# Patient Record
Sex: FEMALE | Race: WHITE | NOT HISPANIC OR LATINO | Employment: FULL TIME
[De-identification: names, ages, dates, MRNs, and addresses within clinical notes are randomized per-mention and may not be internally consistent; named-entity substitution may affect disease eponyms.]

---

## 2018-10-24 ENCOUNTER — RECORDS - HEALTHEAST (OUTPATIENT)
Dept: ADMINISTRATIVE | Facility: OTHER | Age: 43
End: 2018-10-24

## 2018-12-17 ENCOUNTER — OFFICE VISIT - HEALTHEAST (OUTPATIENT)
Dept: RHEUMATOLOGY | Facility: CLINIC | Age: 43
End: 2018-12-17

## 2018-12-17 DIAGNOSIS — I10 ESSENTIAL HYPERTENSION: ICD-10-CM

## 2018-12-17 DIAGNOSIS — M25.50 POLYARTHRALGIA: ICD-10-CM

## 2018-12-17 DIAGNOSIS — M15.0 PRIMARY OSTEOARTHRITIS INVOLVING MULTIPLE JOINTS: ICD-10-CM

## 2018-12-17 LAB — RHEUMATOID FACT SERPL-ACNC: <15 IU/ML (ref 0–30)

## 2018-12-17 RX ORDER — AMLODIPINE BESYLATE 10 MG/1
10 TABLET ORAL DAILY
Status: ON HOLD | COMMUNITY
Start: 2018-08-03 | End: 2022-02-09

## 2018-12-17 ASSESSMENT — MIFFLIN-ST. JEOR: SCORE: 1630.99

## 2018-12-18 LAB
CCP AB SER IA-ACNC: <0.5 U/ML
HCV AB SERPL QL IA: NEGATIVE

## 2018-12-28 ENCOUNTER — COMMUNICATION - HEALTHEAST (OUTPATIENT)
Dept: ADMINISTRATIVE | Facility: CLINIC | Age: 43
End: 2018-12-28

## 2019-01-03 ENCOUNTER — OFFICE VISIT - HEALTHEAST (OUTPATIENT)
Dept: RHEUMATOLOGY | Facility: CLINIC | Age: 44
End: 2019-01-03

## 2019-01-03 DIAGNOSIS — I10 ESSENTIAL HYPERTENSION: ICD-10-CM

## 2019-01-03 DIAGNOSIS — M25.50 POLYARTHRALGIA: ICD-10-CM

## 2019-01-03 DIAGNOSIS — M15.0 PRIMARY OSTEOARTHRITIS INVOLVING MULTIPLE JOINTS: ICD-10-CM

## 2019-02-05 ENCOUNTER — COMMUNICATION - HEALTHEAST (OUTPATIENT)
Dept: RHEUMATOLOGY | Facility: CLINIC | Age: 44
End: 2019-02-05

## 2019-02-05 DIAGNOSIS — M15.0 PRIMARY OSTEOARTHRITIS INVOLVING MULTIPLE JOINTS: ICD-10-CM

## 2019-05-16 ENCOUNTER — COMMUNICATION - HEALTHEAST (OUTPATIENT)
Dept: RHEUMATOLOGY | Facility: CLINIC | Age: 44
End: 2019-05-16

## 2019-05-16 DIAGNOSIS — M15.0 PRIMARY OSTEOARTHRITIS INVOLVING MULTIPLE JOINTS: ICD-10-CM

## 2021-05-25 ENCOUNTER — RECORDS - HEALTHEAST (OUTPATIENT)
Dept: ADMINISTRATIVE | Facility: CLINIC | Age: 46
End: 2021-05-25

## 2021-05-28 NOTE — TELEPHONE ENCOUNTER
meloxicam rx was cancelled at pharmacy as it was already sent this morning, they will put this back and remove from their system.

## 2021-05-28 NOTE — TELEPHONE ENCOUNTER
----- Message from Adria Heredia sent at 5/17/2019 10:31 AM CDT -----  Patient declined appt because she is no longer getting meds from Dr. Solomon.      ----- Message -----  From: Roseanne Teague RN  Sent: 5/17/2019   9:28 AM  To: Rheumatology Scheduling Registration Pool    Please call pt to schedule follow up with Dr Solomon.

## 2021-06-02 ENCOUNTER — RECORDS - HEALTHEAST (OUTPATIENT)
Dept: ADMINISTRATIVE | Facility: CLINIC | Age: 46
End: 2021-06-02

## 2021-06-02 VITALS — HEIGHT: 65 IN | WEIGHT: 222 LBS | BODY MASS INDEX: 36.99 KG/M2

## 2021-06-02 VITALS — BODY MASS INDEX: 37.44 KG/M2 | WEIGHT: 222 LBS

## 2021-06-16 PROBLEM — I10 ESSENTIAL HYPERTENSION: Status: ACTIVE | Noted: 2018-12-17

## 2021-06-16 PROBLEM — M25.50 POLYARTHRALGIA: Status: ACTIVE | Noted: 2018-12-17

## 2021-06-16 PROBLEM — M15.0 PRIMARY OSTEOARTHRITIS INVOLVING MULTIPLE JOINTS: Status: ACTIVE | Noted: 2018-12-17

## 2021-06-22 NOTE — PROGRESS NOTES
ASSESSMENT AND PLAN:  Maria R Mcdaniel 43 y.o. female is seen here on 01/03/19 for follow-up of polyarthralgias.  She does not have evidence of inflammatory joint disease.  There is no evidence of connective tissue disease.  She does not have rheumatoid arthritis, lupus, features of gout, psoriatic arthritis.  There is no serologic evidence of autoimmunity.  She has osteoarthritis.  She is on duloxetine.  Naprosyn did not help her.  We discussed options.  She is going to try meloxicam.  If this does not provide her relief she may consider pain clinic.  We will meet here in the next 3 months.  Diagnoses and all orders for this visit:    Primary osteoarthritis involving multiple joints  -     meloxicam (MOBIC) 15 MG tablet  Dispense: 30 tablet; Refill: 0    Essential hypertension    Polyarthralgia      HISTORY OF PRESENTING ILLNESS:  Maria R Mcdaniel, 43 y.o., female is here for follow-up of recent evaluation of polyarthralgias.  She noted that currently the worst of her symptoms are in the mid feet area bilaterally however she has in addition pain in her feet first MTPs, neck lower back.  This is gone on for the past 6-7 years.  She carries a diagnosis of fibromyalgia.  She is duloxetine which is helped.  She takes ibuprofen over-the-counter.  She has not observed swelling in any of the joints that are painful.  Her symptoms are worse after she has been up and about on her feet.  She rates the pain as severe.  He is stiff in the morning for up to 3-4 hours stenosis neck across the back.  Naprosyn did not help her.   She has noted weight gain of 25 pounds recently, she has felt fatigued and generally weak and tired.  She reports no personal family history of ulcerative colitis Crohn's disease.  She reports that has history of psoriasis, she is going to clarify that if it is eczema or psoriasis.  She was.  She is a smoker alcohol none.  She noted family history of psoriasis dad, who passed away of a coronary  artery disease.  She has worked as a .  Further historical information and ADL limitations as noted in the multidimensional health assessment questionnaire attached in the EMR. ALLERGIES:Patient has no known allergies.    PAST MEDICAL/ACTIVE PROBLEMS/MEDICATION/ FAMILY HISTORY/SOCIAL DATA:  The patient has a family history of  No past medical history on file.  Social History     Tobacco Use   Smoking Status Current Every Day Smoker     Types: Cigarettes   Smokeless Tobacco Never Used     Patient Active Problem List   Diagnosis     Hyperprolactinemia     Obesity     Secondary Amenorrhea     Polyarthralgia     Primary osteoarthritis involving multiple joints     Essential hypertension     Current Outpatient Medications   Medication Sig Dispense Refill     amLODIPine (NORVASC) 10 MG tablet TAKE ONE TABLET BY MOUTH ONE TIME DAILY       DULoxetine (CYMBALTA) 60 MG capsule Take 60 mg by mouth daily.       HYDROcodone-acetaminophen 5-325 mg per tablet Take 1 tablet by mouth every 4 (four) hours as needed for pain.       levothyroxine (SYNTHROID, LEVOTHROID) 125 MCG tablet Take 125 mcg by mouth daily.       losartan (COZAAR) 25 MG tablet Take 100 mg by mouth daily.       meloxicam (MOBIC) 15 MG tablet Take 1 tablet (15 mg total) by mouth daily. 30 tablet 0     No current facility-administered medications for this visit.      DETAILED EXAMINATION  01/03/19  :  Vitals:    01/03/19 0843   BP: 112/80   Patient Site: Right Arm   Patient Position: Sitting   Cuff Size: Adult Large   Pulse: 80   Weight: 222 lb (100.7 kg)     Alert oriented. Head including the face is examined for malar rash, heliotropes, scarring, lupus pernio. Eyes examined for redness such as in episcleritis/scleritis, periorbital lesions.   Neck/ Face examined for parotid gland swelling, range of motion of neck.  Left upper and lower and right upper and lower extremities examined for tenderness, swelling, warmth of the appendicular joints, range of  motion, edema, rash.  Some of the important findings included: She does not have synovitis in any of the palpable joints of upper and lower extremities.  She has tenderness in her feet which is more in the mid feet joint area.  And no synovitis is changes in the MTPs she is tender in the first MTPs bilaterally.  She has minimal impingement of the shoulders.  Nails are painted.  Several tattoos.        She does not have dactylitis of digits or toes.  She has her nails painted.  Several tattoos.  She has tenderness of the first MTPs bilaterally.  She does not have synovitis of any of the palpable joints of upper and lower extremities.  She does not have swelling or JLT effusion of the knees on either side.    LAB / IMAGING DATA:  No results found for: ALT, ALBUMIN, CREATININE    No results found for: WBC, HGB, PLT    Lab Results   Component Value Date    RF <15.0 12/17/2018

## 2021-06-22 NOTE — PROGRESS NOTES
ASSESSMENT AND PLAN:  Maria R Mcdaniel 43 y.o. female is seen here on 12/17/18 for evaluation of polyarthralgias, predominantly affecting the joints in the feet, MTPs, neck and back.  She has evidence of osteoarthritis recent MRI of the foot done elsewhere that report is reviewed.  She is on duloxetine.  She is going to see how her naproxen 500 mg twice daily may help.  1 of the options is to have corticosteroid injection of the first MTPs.  She noted father's history of psoriasis.  The likelihood that she has inflammatory joint disease connective tissue disease is going to be kept under consideration, however appears to be remote.  She is going to monitor her blood pressure carefully as she takes naproxen, she has a home monitoring machine.  We will check labs as noted.  Return for follow-up in the next 2 months.  Diagnoses and all orders for this visit:    Polyarthralgia  -     Rheumatoid Factor Quant  -     CCP Antibodies  -     Hepatitis C Antibody (Anti-HCV)  -     naproxen (NAPROSYN) 500 MG tablet; Take 1 tablet (500 mg total) by mouth 2 (two) times a day with meals.  Dispense: 60 tablet; Refill: 1    Primary osteoarthritis involving multiple joints  -     naproxen (NAPROSYN) 500 MG tablet; Take 1 tablet (500 mg total) by mouth 2 (two) times a day with meals.  Dispense: 60 tablet; Refill: 1    Essential hypertension      HISTORY OF PRESENTING ILLNESS:  Maria R Mcdaniel, 43 y.o., female is here for polyarthralgias.  This is predominant especially her feet first MTPs, knees, neck lower back.  This is gone on for the past 6-7 years.  She carries a diagnosis of fibromyalgia.  She is duloxetine which is helped.  She takes ibuprofen over-the-counter.  She has not observed swelling in any of the joints that are painful.  Her symptoms are worse after she has been up and about on her feet.  She rates the pain as severe.  He is stiff in the morning for up to 3-4 hours stenosis neck across the back.  She has noted  "weight gain of 25 pounds recently, she has felt fatigued and generally weak and tired.  She reports no personal family history of ulcerative colitis Crohn's disease.  She reports that has history of psoriasis, she is going to clarify that if it is eczema or psoriasis.  She was.  She is a smoker alcohol none.  She noted family history of psoriasis dad, who passed away of a coronary artery disease.  She has worked as a .  Further historical information and ADL limitations as noted in the multidimensional health assessment questionnaire attached in the EMR. Rest of the 13 system ROS is negative.     ALLERGIES:Patient has no known allergies.    PAST MEDICAL/ACTIVE PROBLEMS/MEDICATION/ FAMILY HISTORY/SOCIAL DATA:  The patient has a family history of  No past medical history on file.  Social History     Tobacco Use   Smoking Status Current Every Day Smoker     Types: Cigarettes   Smokeless Tobacco Never Used     Patient Active Problem List   Diagnosis     Hyperprolactinemia     Obesity     Secondary Amenorrhea     Current Outpatient Medications   Medication Sig Dispense Refill     amLODIPine (NORVASC) 10 MG tablet TAKE ONE TABLET BY MOUTH ONE TIME DAILY       DULoxetine (CYMBALTA) 60 MG capsule Take 60 mg by mouth daily.       HYDROcodone-acetaminophen 5-325 mg per tablet Take 1 tablet by mouth every 4 (four) hours as needed for pain.       levothyroxine (SYNTHROID, LEVOTHROID) 125 MCG tablet Take 125 mcg by mouth daily.       losartan (COZAAR) 25 MG tablet Take 100 mg by mouth daily.       No current facility-administered medications for this visit.        COMPREHENSIVE EXAMINATION:  Vitals:    12/17/18 1022   BP: 110/80   Patient Site: Right Arm   Patient Position: Sitting   Cuff Size: Adult Regular   Pulse: 76   Weight: 222 lb (100.7 kg)   Height: 5' 4.57\" (1.64 m)     A well appearing alert oriented female. Vital data as noted above. Her eyes without inflammation/scleromalacia. ENTwithout oral mucositis, " thrush, nasal deformity, external ear redness, deformity. Her neck is without lymphadenopathy and supple. Lungs normal sounds, no pleural rub. Heart auscultation normal rate, rhythm; no pericardial rub and murmurs. Abdomen soft, non tender, no organomegaly. Skin examined for heliotrope, malar area eruption, lupus pernio, periungual erythema, sclerodactyly, papules, erythema nodosum, purpura, nail pitting, onycholysis, and obvious psoriasis lesion. Neurological examination shows normal alertness, speech, facial symmetry, tone and power in upper and lower extremities, Tinel's and Phalen's at wrist and gait. The joint examination is performed for swelling, tenderness, warmth, erythema, and range of motion in the following joints: DIPs, PIPs, MCPs, wrists, first CMC's, elbows, shoulders, hips, knees, ankles, feet; spine for range of motion and paraspinal muscles for tenderness. The salient normal / abnormal findings are appended.  She does not have dactylitis of digits or toes.  She has her nails painted.  Several tattoos.  She has tenderness of the first MTPs bilaterally.  She does not have synovitis of any of the palpable joints of upper and lower extremities.  She does not have swelling or JLT effusion of the knees on either side.    LAB / IMAGING DATA:  No results found for: ALT, ALBUMIN, CREATININE    No results found for: WBC, HGB, PLT    No results found for: ANIVAL, RF, SEDRATE

## 2021-07-21 ENCOUNTER — RECORDS - HEALTHEAST (OUTPATIENT)
Dept: ADMINISTRATIVE | Facility: CLINIC | Age: 46
End: 2021-07-21

## 2021-08-17 ENCOUNTER — TRANSFERRED RECORDS (OUTPATIENT)
Dept: HEALTH INFORMATION MANAGEMENT | Facility: CLINIC | Age: 46
End: 2021-08-17
Payer: COMMERCIAL

## 2021-11-01 ENCOUNTER — TRANSFERRED RECORDS (OUTPATIENT)
Dept: HEALTH INFORMATION MANAGEMENT | Facility: CLINIC | Age: 46
End: 2021-11-01
Payer: COMMERCIAL

## 2021-11-11 ENCOUNTER — HOSPITAL ENCOUNTER (OUTPATIENT)
Facility: AMBULATORY SURGERY CENTER | Age: 46
End: 2021-11-11
Attending: OBSTETRICS & GYNECOLOGY

## 2021-11-12 DIAGNOSIS — Z11.59 ENCOUNTER FOR SCREENING FOR OTHER VIRAL DISEASES: ICD-10-CM

## 2022-01-20 ENCOUNTER — OFFICE VISIT (OUTPATIENT)
Dept: RHEUMATOLOGY | Facility: CLINIC | Age: 47
End: 2022-01-20
Payer: COMMERCIAL

## 2022-01-20 VITALS
HEART RATE: 80 BPM | BODY MASS INDEX: 33.7 KG/M2 | WEIGHT: 202.3 LBS | HEIGHT: 65 IN | SYSTOLIC BLOOD PRESSURE: 100 MMHG | DIASTOLIC BLOOD PRESSURE: 76 MMHG

## 2022-01-20 DIAGNOSIS — M25.50 POLYARTHRALGIA: ICD-10-CM

## 2022-01-20 DIAGNOSIS — M15.0 PRIMARY OSTEOARTHRITIS INVOLVING MULTIPLE JOINTS: ICD-10-CM

## 2022-01-20 DIAGNOSIS — M70.61 GREATER TROCHANTERIC BURSITIS OF RIGHT HIP: Primary | ICD-10-CM

## 2022-01-20 PROCEDURE — 20610 DRAIN/INJ JOINT/BURSA W/O US: CPT | Mod: RT | Performed by: INTERNAL MEDICINE

## 2022-01-20 PROCEDURE — 99203 OFFICE O/P NEW LOW 30 MIN: CPT | Mod: 25 | Performed by: INTERNAL MEDICINE

## 2022-01-20 RX ORDER — TRIAMCINOLONE ACETONIDE 40 MG/ML
40 INJECTION, SUSPENSION INTRA-ARTICULAR; INTRAMUSCULAR ONCE
Status: COMPLETED | OUTPATIENT
Start: 2022-01-20 | End: 2022-01-20

## 2022-01-20 RX ADMIN — TRIAMCINOLONE ACETONIDE 40 MG: 40 INJECTION, SUSPENSION INTRA-ARTICULAR; INTRAMUSCULAR at 15:02

## 2022-01-20 ASSESSMENT — MIFFLIN-ST. JEOR: SCORE: 1540.57

## 2022-01-20 NOTE — PROGRESS NOTES
Rheumatology follow-up visit note     Maria R is a 46 year old female presents today for follow-up.    Maria R was seen today for recheck.    Diagnoses and all orders for this visit:    Greater trochanteric bursitis of right hip  -     triamcinolone (KENALOG-40) injection 40 mg    Polyarthralgia    Primary osteoarthritis involving multiple joints            After pros and cons were discussed including risk of infection, bleeding, skin changes including thinning, pigmentary alteration and scarring to name a few, right hip injected as noted in the orders section. This was done with nontouch technique.  The patient tolerated the procedure well and had a brisk Marcaine effect.  The postinjection care was discussed.     Follow-up as needed    HPI    Maria R Mcdaniel is a 46 year old female is here for follow-up after a long interval.  She reports painful hip area by which she meant trochanteric region, this is on the right side, this keeps her up at night, there is no radiation down the leg, she has not had a fall or other trauma recently.  She has carry diagnosis of fibromyalgia she is on duloxetine.  She reports that at nighttime with her fibromyalgia related to poor quality of sleep the right trochanteric pain is making it worse.  Besides the pain in the trochanteric area she has noted discomfort in other joint regions including the knees, the neck and lower back.  She is well known to have spondylosis.    Following is the excerpt from a previous note from 2019:   This is gone on for the past 6-7 years.  She carries a diagnosis of fibromyalgia.  She is duloxetine which is helped.  She takes ibuprofen over-the-counter.  She has not observed swelling in any of the joints that are painful.  Her symptoms are worse after she has been up and about on her feet.  She rates the pain as severe.  He is stiff in the morning for up to 3-4 hours stenosis neck across the back.  Naprosyn did not help her.   She has noted  "weight gain of 25 pounds recently, she has felt fatigued and generally weak and tired.  She reports no personal family history of ulcerative colitis Crohn's disease.  She reports that has history of psoriasis, she is going to clarify that if it is eczema or psoriasis.  She was.  She is a smoker alcohol none.  She noted family history of psoriasis dad, who passed away of a coronary artery disease.  She has worked as a .       DETAILED EXAMINATION  01/20/22  :    Vitals:    01/20/22 1157   BP: 100/76   BP Location: Right arm   Patient Position: Sitting   Cuff Size: Adult Large   Pulse: 80   Weight: 91.8 kg (202 lb 4.8 oz)   Height: 1.638 m (5' 4.5\")     Alert oriented. Head including the face is examined for malar rash, heliotropes, scarring, lupus pernio. Eyes examined for redness such as in episcleritis/scleritis, periorbital lesions.   Neck/ Face examined for parotid gland swelling, range of motion of neck.  Left upper and lower and right upper and lower extremities examined for tenderness, swelling, warmth of the appendicular joints, range of motion, edema, rash.  Some of the important findings included: she does not have evidence of synovitis in the palpable joints of the upper extremities.  No significant deformities of the digits.  No Heberden nodes.  Range of motion of the shoulders   show full abduction.  No JLT effusion or warmth of the knees.  she does not have dactylitis of the digits.  She has tenderness in the trochanteric area on the right side minimal on the left side.     Patient Active Problem List    Diagnosis Date Noted     Polyarthralgia 12/17/2018     Priority: Medium     Primary osteoarthritis involving multiple joints 12/17/2018     Priority: Medium     Essential hypertension 12/17/2018     Priority: Medium     Hyperprolactinemia      Priority: Medium     Created by Conversion         Obesity      Priority: Medium     Created by Conversion         Secondary Amenorrhea      Priority: " Medium     Created by Conversion         Past Surgical History:   Procedure Laterality Date     ZZC GASTRIC BYPASS,OBESE<100CM RODY-EN-Y      Description: Gastric Surgery For Morbid Obesity Bypass With Rdoy-en-Y;  Recorded: 09/16/2008;      No past medical history on file.  No Known Allergies  Current Outpatient Medications   Medication Sig Dispense Refill     amLODIPine (NORVASC) 10 MG tablet [AMLODIPINE (NORVASC) 10 MG TABLET] TAKE ONE TABLET BY MOUTH ONE TIME DAILY       DULoxetine (CYMBALTA) 60 MG capsule [DULOXETINE (CYMBALTA) 60 MG CAPSULE] Take 60 mg by mouth daily.       levothyroxine (SYNTHROID, LEVOTHROID) 125 MCG tablet [LEVOTHYROXINE (SYNTHROID, LEVOTHROID) 125 MCG TABLET] Take 125 mcg by mouth daily.       losartan (COZAAR) 25 MG tablet [LOSARTAN (COZAAR) 25 MG TABLET] Take 100 mg by mouth daily.       MULTIPLE VITAMIN PO Take 1 tablet by mouth daily       HYDROcodone-acetaminophen 5-325 mg per tablet [HYDROCODONE-ACETAMINOPHEN 5-325 MG PER TABLET] Take 1 tablet by mouth every 4 (four) hours as needed for pain. (Patient not taking: Reported on 1/20/2022)       family history is not on file.  Social Connections: Not on file          No results found for: WBC, RBC, HGB, HCT, MCV, MCH, PLT, LYMPH, AST, ALT, ALBUMIN, ALKPHOS, CR, GFRESTIMATED, GFRESTBLACK, CRPOC, CCR, UCRR, SED, CRP, NTBNPI

## 2022-02-01 ENCOUNTER — TELEPHONE (OUTPATIENT)
Dept: RHEUMATOLOGY | Facility: CLINIC | Age: 47
End: 2022-02-01
Payer: COMMERCIAL

## 2022-02-01 NOTE — TELEPHONE ENCOUNTER
I called the pt and informed we did receive the MRI, Dr Solomon reviewed and recommend she come see him for an injection or see ortho to discuss surgery. Pt scheduled with Dr Solomon.

## 2022-02-01 NOTE — TELEPHONE ENCOUNTER
Patient is wondering if clinical received results of MRI from [place] and what the next steps are. Please call to discuss further.

## 2022-02-02 ENCOUNTER — OFFICE VISIT (OUTPATIENT)
Dept: RHEUMATOLOGY | Facility: CLINIC | Age: 47
End: 2022-02-02
Payer: COMMERCIAL

## 2022-02-02 VITALS
HEART RATE: 80 BPM | SYSTOLIC BLOOD PRESSURE: 126 MMHG | DIASTOLIC BLOOD PRESSURE: 76 MMHG | WEIGHT: 201.7 LBS | HEIGHT: 65 IN | BODY MASS INDEX: 33.61 KG/M2

## 2022-02-02 DIAGNOSIS — M25.50 POLYARTHRALGIA: ICD-10-CM

## 2022-02-02 DIAGNOSIS — M15.0 PRIMARY OSTEOARTHRITIS INVOLVING MULTIPLE JOINTS: ICD-10-CM

## 2022-02-02 DIAGNOSIS — M75.81 TENDINITIS OF RIGHT ROTATOR CUFF: Primary | ICD-10-CM

## 2022-02-02 PROCEDURE — 20610 DRAIN/INJ JOINT/BURSA W/O US: CPT | Mod: RT | Performed by: INTERNAL MEDICINE

## 2022-02-02 PROCEDURE — 99214 OFFICE O/P EST MOD 30 MIN: CPT | Mod: 25 | Performed by: INTERNAL MEDICINE

## 2022-02-02 RX ORDER — TRIAMCINOLONE ACETONIDE 40 MG/ML
40 INJECTION, SUSPENSION INTRA-ARTICULAR; INTRAMUSCULAR ONCE
Status: COMPLETED | OUTPATIENT
Start: 2022-02-02 | End: 2022-02-02

## 2022-02-02 RX ADMIN — TRIAMCINOLONE ACETONIDE 40 MG: 40 INJECTION, SUSPENSION INTRA-ARTICULAR; INTRAMUSCULAR at 14:34

## 2022-02-02 ASSESSMENT — MIFFLIN-ST. JEOR: SCORE: 1537.85

## 2022-02-02 NOTE — PROGRESS NOTES
Rheumatology follow-up visit note     Maria R is a 46 year old female presents today for follow-up.    Maria R was seen today for recheck.    Diagnoses and all orders for this visit:    Tendinitis of right rotator cuff  -     triamcinolone (KENALOG-40) injection 40 mg  -     ASPIRATION/INJECTION MAJOR JOINT    Polyarthralgia    Primary osteoarthritis involving multiple joints        This patient has right shoulder rotator cuff tendinopathy, tear supraspinatus, good response to corticosteroid injections in the remote past, worsening pain, she wonders if she could have another corticosteroid injection pros and cons were reviewed 40 mg Kenalog injected subacromially on the right side.  Follow-up as needed.  HPI    Maria R Mcdaniel is a 46 year old female is here for worsening pain this is in her right shoulder, this is gone on for several weeks, she was seen in orthopedics, she had corticosteroid injection subacromially it would appear, an MRI done at that point of the right shoulder report is reviewed and attached in the chart.  She has supraspinatus tear and other tendons showing evidence of tendinopathy without features of inflammatory joint disease such as any exuberant synovitis or significant joint effusion.  Recently she had trochanteric area injection which provided her good relief.  She has noted discomfort in other joint regions including the knees, the neck and lower back.  She is well known to have spondylosis.    Following is the excerpt from a previous note from 2019:   This is gone on for the past 6-7 years.  She carries a diagnosis of fibromyalgia.  She is duloxetine which is helped.  She takes ibuprofen over-the-counter.  She has not observed swelling in any of the joints that are painful.  Her symptoms are worse after she has been up and about on her feet.  She rates the pain as severe.  He is stiff in the morning for up to 3-4 hours stenosis neck across the back.  Naprosyn did not help  "her.   She has noted weight gain of 25 pounds recently, she has felt fatigued and generally weak and tired.  She reports no personal family history of ulcerative colitis Crohn's disease.  She reports that has history of psoriasis, she is going to clarify that if it is eczema or psoriasis.  She was.  She is a smoker alcohol none.  She noted family history of psoriasis dad, who passed away of a coronary artery disease.  She has worked as a .           DETAILED EXAMINATION  02/02/22  :    Vitals:    02/02/22 1353   BP: 126/76   BP Location: Right arm   Patient Position: Sitting   Cuff Size: Adult Large   Pulse: 80   Weight: 91.5 kg (201 lb 11.2 oz)   Height: 1.638 m (5' 4.5\")     Alert oriented. Head including the face is examined for malar rash, heliotropes, scarring, lupus pernio. Eyes examined for redness such as in episcleritis/scleritis, periorbital lesions.   Neck/ Face examined for parotid gland swelling, range of motion of neck.  Left upper and lower and right upper and lower extremities examined for tenderness, swelling, warmth of the appendicular joints, range of motion, edema, rash.  Some of the important findings included: she does not have evidence of synovitis in the palpable joints of the upper extremities.  No significant deformities of the digits.  No Heberden nodes.  Range of motion of the shoulders  show full abduction on the left side on the right she has painful arc and able to abduct actively to about 160 degrees..  No JLT effusion or warmth of the knees.  she does not have dactylitis of the digits.     Patient Active Problem List    Diagnosis Date Noted     Polyarthralgia 12/17/2018     Priority: Medium     Primary osteoarthritis involving multiple joints 12/17/2018     Priority: Medium     Essential hypertension 12/17/2018     Priority: Medium     Hyperprolactinemia      Priority: Medium     Created by Conversion         Obesity      Priority: Medium     Created by Conversion         " Secondary Amenorrhea      Priority: Medium     Created by Conversion         Past Surgical History:   Procedure Laterality Date     ZZC GASTRIC BYPASS,OBESE<100CM RODY-EN-Y      Description: Gastric Surgery For Morbid Obesity Bypass With Rody-en-Y;  Recorded: 09/16/2008;      No past medical history on file.  No Known Allergies  Current Outpatient Medications   Medication Sig Dispense Refill     amLODIPine (NORVASC) 10 MG tablet [AMLODIPINE (NORVASC) 10 MG TABLET] TAKE ONE TABLET BY MOUTH ONE TIME DAILY       DULoxetine (CYMBALTA) 60 MG capsule [DULOXETINE (CYMBALTA) 60 MG CAPSULE] Take 60 mg by mouth daily.       levothyroxine (SYNTHROID, LEVOTHROID) 125 MCG tablet [LEVOTHYROXINE (SYNTHROID, LEVOTHROID) 125 MCG TABLET] Take 125 mcg by mouth daily.       losartan (COZAAR) 25 MG tablet [LOSARTAN (COZAAR) 25 MG TABLET] Take 100 mg by mouth daily.       MULTIPLE VITAMIN PO Take 1 tablet by mouth daily       HYDROcodone-acetaminophen 5-325 mg per tablet [HYDROCODONE-ACETAMINOPHEN 5-325 MG PER TABLET] Take 1 tablet by mouth every 4 (four) hours as needed for pain. (Patient not taking: Reported on 1/20/2022)       family history is not on file.  Social Connections: Not on file          No results found for: WBC, RBC, HGB, HCT, MCV, MCH, PLT, LYMPH, AST, ALT, ALBUMIN, ALKPHOS, CR, GFRESTIMATED, GFRESTBLACK, CRPOC, CCR, UCRR, SED, CRP, NTBNPI

## 2022-02-07 PROBLEM — K63.1 PERFORATION OF INTESTINE (H): Status: ACTIVE | Noted: 2022-02-07

## 2022-02-07 PROBLEM — K85.00 IDIOPATHIC ACUTE PANCREATITIS WITHOUT INFECTION OR NECROSIS: Status: ACTIVE | Noted: 2022-02-07

## 2022-02-07 LAB
ABO/RH(D): NORMAL
ANTIBODY SCREEN: NEGATIVE
LACTATE SERPL-SCNC: 0.5 MMOL/L (ref 0.7–2)
SPECIMEN EXPIRATION DATE: NORMAL

## 2022-02-07 PROCEDURE — 96375 TX/PRO/DX INJ NEW DRUG ADDON: CPT

## 2022-02-07 PROCEDURE — 96361 HYDRATE IV INFUSION ADD-ON: CPT

## 2022-02-07 PROCEDURE — 258N000003 HC RX IP 258 OP 636: Performed by: EMERGENCY MEDICINE

## 2022-02-07 PROCEDURE — 96365 THER/PROPH/DIAG IV INF INIT: CPT

## 2022-02-07 PROCEDURE — 36415 COLL VENOUS BLD VENIPUNCTURE: CPT | Performed by: EMERGENCY MEDICINE

## 2022-02-07 PROCEDURE — 83605 ASSAY OF LACTIC ACID: CPT | Performed by: EMERGENCY MEDICINE

## 2022-02-07 PROCEDURE — C9803 HOPD COVID-19 SPEC COLLECT: HCPCS

## 2022-02-07 PROCEDURE — 99285 EMERGENCY DEPT VISIT HI MDM: CPT | Mod: 25

## 2022-02-07 RX ORDER — PIPERACILLIN SODIUM, TAZOBACTAM SODIUM 3; .375 G/15ML; G/15ML
3.38 INJECTION, POWDER, LYOPHILIZED, FOR SOLUTION INTRAVENOUS ONCE
Status: COMPLETED | OUTPATIENT
Start: 2022-02-07 | End: 2022-02-08

## 2022-02-07 RX ORDER — DULOXETIN HYDROCHLORIDE 30 MG/1
30 CAPSULE, DELAYED RELEASE ORAL DAILY
COMMUNITY
End: 2023-02-18

## 2022-02-07 RX ORDER — SODIUM CHLORIDE 9 MG/ML
INJECTION, SOLUTION INTRAVENOUS ONCE
Status: COMPLETED | OUTPATIENT
Start: 2022-02-07 | End: 2022-02-08

## 2022-02-07 RX ORDER — IBUPROFEN 800 MG/1
800 TABLET, FILM COATED ORAL EVERY 8 HOURS PRN
Status: ON HOLD | COMMUNITY
End: 2022-02-09

## 2022-02-07 RX ADMIN — SODIUM CHLORIDE: 9 INJECTION, SOLUTION INTRAVENOUS at 22:33

## 2022-02-08 ENCOUNTER — APPOINTMENT (OUTPATIENT)
Dept: RADIOLOGY | Facility: HOSPITAL | Age: 47
DRG: 393 | End: 2022-02-08
Attending: EMERGENCY MEDICINE
Payer: COMMERCIAL

## 2022-02-08 ENCOUNTER — HOSPITAL ENCOUNTER (INPATIENT)
Facility: HOSPITAL | Age: 47
LOS: 1 days | Discharge: HOME | DRG: 393 | End: 2022-02-09
Attending: EMERGENCY MEDICINE | Admitting: STUDENT IN AN ORGANIZED HEALTH CARE EDUCATION/TRAINING PROGRAM
Payer: COMMERCIAL

## 2022-02-08 DIAGNOSIS — K28.9 MARGINAL ULCER: Primary | ICD-10-CM

## 2022-02-08 DIAGNOSIS — K63.1 PERFORATION OF INTESTINE (H): ICD-10-CM

## 2022-02-08 DIAGNOSIS — K85.00 IDIOPATHIC ACUTE PANCREATITIS WITHOUT INFECTION OR NECROSIS: ICD-10-CM

## 2022-02-08 LAB
ALBUMIN SERPL-MCNC: 3.3 G/DL (ref 3.5–5)
ALP SERPL-CCNC: 46 U/L (ref 45–120)
ALT SERPL W P-5'-P-CCNC: 17 U/L (ref 0–45)
ANION GAP SERPL CALCULATED.3IONS-SCNC: 8 MMOL/L (ref 5–18)
AST SERPL W P-5'-P-CCNC: 20 U/L (ref 0–40)
ATRIAL RATE - MUSE: 69 BPM
BASOPHILS # BLD AUTO: 0 10E3/UL (ref 0–0.2)
BASOPHILS NFR BLD AUTO: 0 %
BILIRUB SERPL-MCNC: 0.9 MG/DL (ref 0–1)
BUN SERPL-MCNC: 12 MG/DL (ref 8–22)
CALCIUM SERPL-MCNC: 8.3 MG/DL (ref 8.5–10.5)
CHLORIDE BLD-SCNC: 112 MMOL/L (ref 98–107)
CO2 SERPL-SCNC: 20 MMOL/L (ref 22–31)
CREAT SERPL-MCNC: 0.63 MG/DL (ref 0.6–1.1)
DIASTOLIC BLOOD PRESSURE - MUSE: NORMAL MMHG
EOSINOPHIL # BLD AUTO: 0.3 10E3/UL (ref 0–0.7)
EOSINOPHIL NFR BLD AUTO: 3 %
ERYTHROCYTE [DISTWIDTH] IN BLOOD BY AUTOMATED COUNT: 14.6 % (ref 10–15)
GFR SERPL CREATININE-BSD FRML MDRD: >90 ML/MIN/1.73M2
GLUCOSE BLD-MCNC: 93 MG/DL (ref 70–125)
HCG SERPL QL: NEGATIVE
HCT VFR BLD AUTO: 34.7 % (ref 35–47)
HGB BLD-MCNC: 11 G/DL (ref 11.7–15.7)
IMM GRANULOCYTES # BLD: 0 10E3/UL
IMM GRANULOCYTES NFR BLD: 0 %
INTERPRETATION ECG - MUSE: NORMAL
LIPASE SERPL-CCNC: 77 U/L (ref 0–52)
LYMPHOCYTES # BLD AUTO: 2.3 10E3/UL (ref 0.8–5.3)
LYMPHOCYTES NFR BLD AUTO: 23 %
MCH RBC QN AUTO: 26.1 PG (ref 26.5–33)
MCHC RBC AUTO-ENTMCNC: 31.7 G/DL (ref 31.5–36.5)
MCV RBC AUTO: 82 FL (ref 78–100)
MONOCYTES # BLD AUTO: 0.7 10E3/UL (ref 0–1.3)
MONOCYTES NFR BLD AUTO: 7 %
NEUTROPHILS # BLD AUTO: 6.5 10E3/UL (ref 1.6–8.3)
NEUTROPHILS NFR BLD AUTO: 67 %
NRBC # BLD AUTO: 0 10E3/UL
NRBC BLD AUTO-RTO: 0 /100
P AXIS - MUSE: 50 DEGREES
PLATELET # BLD AUTO: 256 10E3/UL (ref 150–450)
POTASSIUM BLD-SCNC: 3.9 MMOL/L (ref 3.5–5)
PR INTERVAL - MUSE: 160 MS
PROT SERPL-MCNC: 6 G/DL (ref 6–8)
QRS DURATION - MUSE: 128 MS
QT - MUSE: 446 MS
QTC - MUSE: 477 MS
R AXIS - MUSE: -21 DEGREES
RBC # BLD AUTO: 4.22 10E6/UL (ref 3.8–5.2)
SARS-COV-2 RNA RESP QL NAA+PROBE: NEGATIVE
SODIUM SERPL-SCNC: 140 MMOL/L (ref 136–145)
SYSTOLIC BLOOD PRESSURE - MUSE: NORMAL MMHG
T AXIS - MUSE: 92 DEGREES
VENTRICULAR RATE- MUSE: 69 BPM
WBC # BLD AUTO: 9.8 10E3/UL (ref 4–11)

## 2022-02-08 PROCEDURE — 250N000011 HC RX IP 250 OP 636: Performed by: EMERGENCY MEDICINE

## 2022-02-08 PROCEDURE — 99222 1ST HOSP IP/OBS MODERATE 55: CPT | Performed by: SURGERY

## 2022-02-08 PROCEDURE — 86850 RBC ANTIBODY SCREEN: CPT | Performed by: EMERGENCY MEDICINE

## 2022-02-08 PROCEDURE — 85025 COMPLETE CBC W/AUTO DIFF WBC: CPT | Performed by: STUDENT IN AN ORGANIZED HEALTH CARE EDUCATION/TRAINING PROGRAM

## 2022-02-08 PROCEDURE — 83690 ASSAY OF LIPASE: CPT | Performed by: STUDENT IN AN ORGANIZED HEALTH CARE EDUCATION/TRAINING PROGRAM

## 2022-02-08 PROCEDURE — 250N000011 HC RX IP 250 OP 636: Performed by: STUDENT IN AN ORGANIZED HEALTH CARE EDUCATION/TRAINING PROGRAM

## 2022-02-08 PROCEDURE — 71046 X-RAY EXAM CHEST 2 VIEWS: CPT

## 2022-02-08 PROCEDURE — 87635 SARS-COV-2 COVID-19 AMP PRB: CPT | Performed by: EMERGENCY MEDICINE

## 2022-02-08 PROCEDURE — 93005 ELECTROCARDIOGRAM TRACING: CPT | Performed by: EMERGENCY MEDICINE

## 2022-02-08 PROCEDURE — 99222 1ST HOSP IP/OBS MODERATE 55: CPT | Performed by: STUDENT IN AN ORGANIZED HEALTH CARE EDUCATION/TRAINING PROGRAM

## 2022-02-08 PROCEDURE — 258N000001 HC RX 258: Performed by: STUDENT IN AN ORGANIZED HEALTH CARE EDUCATION/TRAINING PROGRAM

## 2022-02-08 PROCEDURE — C9113 INJ PANTOPRAZOLE SODIUM, VIA: HCPCS | Performed by: EMERGENCY MEDICINE

## 2022-02-08 PROCEDURE — 82040 ASSAY OF SERUM ALBUMIN: CPT | Performed by: STUDENT IN AN ORGANIZED HEALTH CARE EDUCATION/TRAINING PROGRAM

## 2022-02-08 PROCEDURE — 80053 COMPREHEN METABOLIC PANEL: CPT | Performed by: STUDENT IN AN ORGANIZED HEALTH CARE EDUCATION/TRAINING PROGRAM

## 2022-02-08 PROCEDURE — 258N000003 HC RX IP 258 OP 636: Performed by: EMERGENCY MEDICINE

## 2022-02-08 PROCEDURE — 84703 CHORIONIC GONADOTROPIN ASSAY: CPT | Performed by: EMERGENCY MEDICINE

## 2022-02-08 PROCEDURE — 86901 BLOOD TYPING SEROLOGIC RH(D): CPT | Performed by: EMERGENCY MEDICINE

## 2022-02-08 PROCEDURE — 36415 COLL VENOUS BLD VENIPUNCTURE: CPT | Performed by: STUDENT IN AN ORGANIZED HEALTH CARE EDUCATION/TRAINING PROGRAM

## 2022-02-08 PROCEDURE — 120N000001 HC R&B MED SURG/OB

## 2022-02-08 PROCEDURE — 36415 COLL VENOUS BLD VENIPUNCTURE: CPT | Performed by: EMERGENCY MEDICINE

## 2022-02-08 RX ORDER — ONDANSETRON 2 MG/ML
4 INJECTION INTRAMUSCULAR; INTRAVENOUS ONCE
Status: COMPLETED | OUTPATIENT
Start: 2022-02-08 | End: 2022-02-08

## 2022-02-08 RX ORDER — ONDANSETRON 4 MG/1
4 TABLET, ORALLY DISINTEGRATING ORAL EVERY 6 HOURS PRN
Status: DISCONTINUED | OUTPATIENT
Start: 2022-02-08 | End: 2022-02-09 | Stop reason: HOSPADM

## 2022-02-08 RX ORDER — LIDOCAINE 40 MG/G
CREAM TOPICAL
Status: DISCONTINUED | OUTPATIENT
Start: 2022-02-08 | End: 2022-02-09 | Stop reason: HOSPADM

## 2022-02-08 RX ORDER — PROCHLORPERAZINE MALEATE 10 MG
10 TABLET ORAL EVERY 6 HOURS PRN
Status: DISCONTINUED | OUTPATIENT
Start: 2022-02-08 | End: 2022-02-09 | Stop reason: HOSPADM

## 2022-02-08 RX ORDER — PROCHLORPERAZINE 25 MG
25 SUPPOSITORY, RECTAL RECTAL EVERY 12 HOURS PRN
Status: DISCONTINUED | OUTPATIENT
Start: 2022-02-08 | End: 2022-02-09 | Stop reason: HOSPADM

## 2022-02-08 RX ORDER — IBUPROFEN 800 MG/1
800 TABLET, FILM COATED ORAL EVERY 8 HOURS PRN
Status: DISCONTINUED | OUTPATIENT
Start: 2022-02-08 | End: 2022-02-09 | Stop reason: HOSPADM

## 2022-02-08 RX ORDER — ACETAMINOPHEN 325 MG/1
650 TABLET ORAL EVERY 6 HOURS PRN
Status: DISCONTINUED | OUTPATIENT
Start: 2022-02-08 | End: 2022-02-09 | Stop reason: HOSPADM

## 2022-02-08 RX ORDER — AMLODIPINE BESYLATE 5 MG/1
10 TABLET ORAL DAILY
Status: DISCONTINUED | OUTPATIENT
Start: 2022-02-08 | End: 2022-02-09 | Stop reason: HOSPADM

## 2022-02-08 RX ORDER — ACETAMINOPHEN 650 MG/1
650 SUPPOSITORY RECTAL EVERY 6 HOURS PRN
Status: DISCONTINUED | OUTPATIENT
Start: 2022-02-08 | End: 2022-02-09 | Stop reason: HOSPADM

## 2022-02-08 RX ORDER — PIPERACILLIN SODIUM, TAZOBACTAM SODIUM 3; .375 G/15ML; G/15ML
3.38 INJECTION, POWDER, LYOPHILIZED, FOR SOLUTION INTRAVENOUS EVERY 8 HOURS
Status: DISCONTINUED | OUTPATIENT
Start: 2022-02-08 | End: 2022-02-09

## 2022-02-08 RX ORDER — LEVOTHYROXINE SODIUM 25 UG/1
50 TABLET ORAL DAILY
Status: DISCONTINUED | OUTPATIENT
Start: 2022-02-08 | End: 2022-02-09 | Stop reason: HOSPADM

## 2022-02-08 RX ORDER — DULOXETIN HYDROCHLORIDE 60 MG/1
60 CAPSULE, DELAYED RELEASE ORAL DAILY
Status: DISCONTINUED | OUTPATIENT
Start: 2022-02-08 | End: 2022-02-08

## 2022-02-08 RX ORDER — ONDANSETRON 2 MG/ML
4 INJECTION INTRAMUSCULAR; INTRAVENOUS EVERY 6 HOURS PRN
Status: DISCONTINUED | OUTPATIENT
Start: 2022-02-08 | End: 2022-02-09 | Stop reason: HOSPADM

## 2022-02-08 RX ORDER — MORPHINE SULFATE 4 MG/ML
4 INJECTION, SOLUTION INTRAMUSCULAR; INTRAVENOUS ONCE
Status: COMPLETED | OUTPATIENT
Start: 2022-02-08 | End: 2022-02-08

## 2022-02-08 RX ORDER — HYDRALAZINE HYDROCHLORIDE 20 MG/ML
10 INJECTION INTRAMUSCULAR; INTRAVENOUS EVERY 6 HOURS PRN
Status: DISCONTINUED | OUTPATIENT
Start: 2022-02-08 | End: 2022-02-09 | Stop reason: HOSPADM

## 2022-02-08 RX ADMIN — ONDANSETRON 4 MG: 2 INJECTION INTRAMUSCULAR; INTRAVENOUS at 01:47

## 2022-02-08 RX ADMIN — PIPERACILLIN AND TAZOBACTAM 3.38 G: 3; .375 INJECTION, POWDER, LYOPHILIZED, FOR SOLUTION INTRAVENOUS at 01:55

## 2022-02-08 RX ADMIN — PIPERACILLIN AND TAZOBACTAM 3.38 G: 3; .375 INJECTION, POWDER, LYOPHILIZED, FOR SOLUTION INTRAVENOUS at 16:11

## 2022-02-08 RX ADMIN — DEXTROSE AND SODIUM CHLORIDE: 5; 450 INJECTION, SOLUTION INTRAVENOUS at 03:55

## 2022-02-08 RX ADMIN — SODIUM CHLORIDE 8 MG/HR: 9 INJECTION, SOLUTION INTRAVENOUS at 14:13

## 2022-02-08 RX ADMIN — MORPHINE SULFATE 4 MG: 4 INJECTION INTRAVENOUS at 01:50

## 2022-02-08 RX ADMIN — SODIUM CHLORIDE 8 MG/HR: 9 INJECTION, SOLUTION INTRAVENOUS at 02:34

## 2022-02-08 RX ADMIN — PIPERACILLIN AND TAZOBACTAM 3.38 G: 3; .375 INJECTION, POWDER, LYOPHILIZED, FOR SOLUTION INTRAVENOUS at 08:10

## 2022-02-08 ASSESSMENT — ACTIVITIES OF DAILY LIVING (ADL)
ADLS_ACUITY_SCORE: 12
ADLS_ACUITY_SCORE: 4
ADLS_ACUITY_SCORE: 12
WALKING_OR_CLIMBING_STAIRS_DIFFICULTY: NO
DIFFICULTY_EATING/SWALLOWING: NO
ADLS_ACUITY_SCORE: 7
ADLS_ACUITY_SCORE: 7
ADLS_ACUITY_SCORE: 12
ADLS_ACUITY_SCORE: 4
ADLS_ACUITY_SCORE: 7
ADLS_ACUITY_SCORE: 12
ADLS_ACUITY_SCORE: 7
ADLS_ACUITY_SCORE: 4
ADLS_ACUITY_SCORE: 4
ADLS_ACUITY_SCORE: 12
WEAR_GLASSES_OR_BLIND: YES
DIFFICULTY_COMMUNICATING: NO
ADLS_ACUITY_SCORE: 7
CONCENTRATING,_REMEMBERING_OR_MAKING_DECISIONS_DIFFICULTY: NO
ADLS_ACUITY_SCORE: 12
TOILETING_ISSUES: NO
FALL_HISTORY_WITHIN_LAST_SIX_MONTHS: NO
DEPENDENT_IADLS:: INDEPENDENT
DRESSING/BATHING_DIFFICULTY: NO
ADLS_ACUITY_SCORE: 12
ADLS_ACUITY_SCORE: 4
ADLS_ACUITY_SCORE: 7
DOING_ERRANDS_INDEPENDENTLY_DIFFICULTY: NO
ADLS_ACUITY_SCORE: 12
ADLS_ACUITY_SCORE: 12
HEARING_DIFFICULTY_OR_DEAF: NO
ADLS_ACUITY_SCORE: 4

## 2022-02-08 ASSESSMENT — MIFFLIN-ST. JEOR: SCORE: 1523.33

## 2022-02-08 NOTE — H&P
"[facility]    History and Physical - Hospitalist Service       Date of Admission:  2/8/2022    Assessment & Plan      Maria R Mcdaniel is a 46 year old female admitted on 2/8/2022.   46-year-old female with past medical history of gastric bypass 2018, hypertension presents with abdominal pain      Abdominal pain  History of Rody-en-Y bypass  Anastomotic site marginal ulcer Vs localized perforation  -Initially presented to urgent care and CT abdomen done there showed large amount of soft tissue stranding along the anterior gastric pouch/Rousx limb anastomosis with prominent region of air along the anterior anastomosis site concerning for marginal ulcer versus localized perforation.CXR here with no air underdiaphragm  -Surgery consult, IV PPI drip,IV zosyn,  n.p.o., pain control    Elevated lipase - from urgent care 482 ,CT abdomen not showing any pancreatic inflammation.  Check in the am    Hypertension hydralazine prn, resume home meds when able    Hypothyroidism-will resume home levothyroxine when able to take p.o.    Depression/mood-duloxetine when tolerating p.o.       Diet: NPO for Medical/Clinical Reasons Except for: Meds    DVT Prophylaxis: Pneumatic Compression Devices  Zhong Catheter: Not present  Central Lines: None  Cardiac Monitoring: None  Code Status:  Full    Clinically Significant Risk Factors Present on Admission     # Obesity: Estimated body mass index is 33.8 kg/m  as calculated from the following:    Height as of 2/2/22: 1.638 m (5' 4.5\").    Weight as of this encounter: 90.7 kg (200 lb).      Disposition Plan   Expected Discharge: The patient's care was discussed with the Patient.    Martin Iniguez MD  Hospitalist Service  [facility]  Securely message with the Vocera Web Console (learn more here)  Text page via [employer] Paging/Directory         ______________________________________________________________________    Chief Complaint   Abdominal " pain    History is obtained from the patient    History of Present Illness   Maria R Mcdaniel is a 46-year-old female with past medical history of gastric bypass 2018 and hypertension presents with abdominal pain.  Pain is mainly epigastric region intermittent for the last 1 week with associated constipation and nausea but no vomiting.  Reports similar pain in the past which resolves by itself for which reason she did not seek medical attention.  Initially presented to urgent care and CT abdomen showing possible marginal ulcer versus perforation patient was referred to the ED here for further eval and management.    Review of Systems    The 10 point Review of Systems is negative other than noted in the HPI or here.     Past Medical History    I have reviewed this patient's medical history and updated it with pertinent information if needed.   History reviewed. No pertinent past medical history.    Past Surgical History   I have reviewed this patient's surgical history and updated it with pertinent information if needed.  Past Surgical History:   Procedure Laterality Date     ZZC GASTRIC BYPASS,OBESE<100CM RODY-EN-Y      Description: Gastric Surgery For Morbid Obesity Bypass With Rody-en-Y;  Recorded: 09/16/2008;       Social History   I have reviewed this patient's social history and updated it with pertinent information if needed.  Social History     Tobacco Use     Smoking status: Current Every Day Smoker     Types: Cigarettes, Cigarettes     Smokeless tobacco: Never Used   Substance Use Topics     Alcohol use: Yes     Comment: Alcoholic Drinks/day: occasionally      Drug use: None       Family History   History reviewed, noncontributory to current complaint.      Prior to Admission Medications   Prior to Admission Medications   Prescriptions Last Dose Informant Patient Reported? Taking?   DULoxetine (CYMBALTA) 30 MG capsule 2/7/2022 at am  Yes Yes   Sig: Take 30 mg by mouth daily Total dose is 90 mg    DULoxetine (CYMBALTA) 60 MG capsule 2/7/2022 at am  Yes Yes   Sig: Take 60 mg by mouth daily Total dose is 90 mg   MULTIPLE VITAMIN PO 2/7/2022 at am  Yes Yes   Sig: Take 1 tablet by mouth daily   amLODIPine (NORVASC) 10 MG tablet 2/7/2022 at am  Yes Yes   Sig: Take 10 mg by mouth daily    ibuprofen (ADVIL/MOTRIN) 800 MG tablet 2/7/2022 at pm  Yes Yes   Sig: Take 800 mg by mouth every 8 hours as needed for moderate pain   levothyroxine (SYNTHROID/LEVOTHROID) 50 MCG tablet 2/7/2022 at am  Yes Yes   Sig: Take 50 mcg by mouth daily    losartan (COZAAR) 100 MG tablet 2/7/2022 at am  Yes Yes   Sig: Take 100 mg by mouth daily       Facility-Administered Medications: None     Allergies   No Known Allergies    Physical Exam   Vital Signs: Temp: 97.2  F (36.2  C) Temp src: Temporal BP: 130/71 Pulse: 66   Resp: 17 SpO2: 97 % O2 Device: None (Room air)    Weight: 200 lbs 0 oz    General Appearance: Alert oriented  HEENT: Pink conjunctive a, NIS  Respiratory: PERRLA, EOMI  Cardiovascular: S1-S2, regular rhythm no gallop  GI: Soft nontender abdomen, normoactive bowel sounds  Genitourinary: No CVA or suprapubic tenderness  Skin: No skin rashes  Musculoskeletal: No peripheral edema  Neurologic: AOx3      Data   Data reviewed today: I reviewed all medications, new labs and imaging results over the last 24 hours. I personally reviewed   No lab results found in last 7 days.    Recent Results (from the past 24 hour(s))   Chest XR,  PA & LAT    Narrative    EXAM: XR CHEST 2 VW  LOCATION: [facility]  DATE/TIME: 2/8/2022 1:14 AM    INDICATION: Preoperative evaluation.  COMPARISON: None.      Impression    IMPRESSION: Normal heart size and pulmonary vascularity. Lungs clear. No pneumothorax or pleural fluid. Degenerative changes both shoulders and within the spine. Postsurgical changes upper abdomen.

## 2022-02-08 NOTE — ED PROVIDER NOTES
Expected Patient Referral to ED  8:59 PM    Referring Clinic/Provider:  [facility]    Reason for referral/Clinical facts:  Gastric bypass pt - marginal ulcer vs early perf around anastamosis   1wk of abd pain    Recommendations provided:  Send to ED for further evaluation    Caller was informed that this institution does possess the capabilities and/or resources to provide for patient and should be transferred to our facility.    Discussed that if direct admit is sought and any hurdles are encountered, this ED would be happy to see the patient and evaluate.    Informed caller that recommendations provided are recommendations based only on the facts provided and that they responsible to accept or reject the advice, or to seek a formal in person consultation as needed and that this ED will see/treat patient should they arrive.      Shannon Benson MD  Emergency Medicine  [facility] EMERGENCY DEPARTMENT  [address] [place] [id number]  [phone]     Shannon Benson MD  02/07/22 1096

## 2022-02-08 NOTE — PROGRESS NOTES
[facility]    Medicine Progress Note - Hospitalist Service       Date of Admission:  2/8/2022    Assessment & Plan            Maria R Mcdaniel is a 46 year old female admitted on 2/8/2022. She has history of hypertension, obesity, RnY gastric bypass surgery in 2018 presented for evaluation of epigastric pain found to have possible perforated marginal ulcer. Hospital Day: 1     #Suspected perforated marginal ulcer  Patient has been taking ibuprofen at home.  Presented for 1 week of epigastric pain, CT scan showing soft tissue stranding along the anterior gastric pouch/Rody limb with prominent region of air along the anastomosis site concerning for possible localized perforation.  General surgery following, recommends continued n.p.o., PPI drip.  Patient with significant symptomatic improvement.  Surgeon planning possible upper GI study tomorrow to assess if there is ongoing leak.  Patient needs to stop taking NSAIDs  Continue IV Zosyn  IV Dilaudid, Zofran as needed  N.p.o.    #Elevated lipase  482 at Urgent Care, 77 here  Without clinical or imaging s/s of pancreatitis    #Pseudohypocalcemia, corrects for slightly low albumin    #Mood disorder, holding home Cymbalta while n.p.o.  #Hypertension, holding home amlodipine and losartan while n.p.o.  #Hypothyroidism, holding home Synthroid while n.p.o.  If n.p.o. becomes prolonged we will initiate IV Synthroid  #Obesity with BMI 33, noted       Diet: NPO for Medical/Clinical Reasons Except for: Meds, Ice Chips    DVT Prophylaxis: Low risk. ambulation   Zhong Catheter: Not present  Central Lines: None  Code Status: Full Code      Disposition Plan   Disposition: Home when ready, likely multiple days  Discharge barriers: NPO, PPI drip, IV abx  Medically ready to discharge today: No  Estimated discharge date: 02/10/2022     The patient's care was discussed with the Patient.    Therese Cotto MD  Hospitalist Service  [facility]  Hospital  Text page via [facility] Paging/Directory    ____________        Physical Exam   Vital Signs: Temp: 97.2  F (36.2  C) Temp src: Temporal BP: 113/68 Pulse: 61   Resp: 17 SpO2: 95 % O2 Device: None (Room air)    Weight: 200 lbs 0 oz  General: in no apparent distress, non-toxic and alert female lying in hospital bed oriented x3  HEENT: Head normocephalic atraumatic, oral mucosa moist. Sclerae anicteric  CV: Regular rhythm, normal rate, no murmurs  Resp: No wheezes, no rales or rhonchi, no focal consolidations  GI: Belly soft, nondistended, nontender, bowel sounds present  Skin: No rashes or lesions  Extremities: No peripheral edema  Psych: Normal affect, mood euthymic  Neuro: CNII-XII grossly intact, moving all 4 extremities      Data   Recent Results (from the past 24 hour(s))   Lactic acid whole blood    Collection Time: 02/07/22 10:30 PM   Result Value Ref Range    Lactic Acid 0.5 (L) 0.7 - 2.0 mmol/L   Adult Type and Screen    Collection Time: 02/08/22  1:40 AM   Result Value Ref Range    ABO/RH(D) O POS     Antibody Screen Negative Negative    SPECIMEN EXPIRATION DATE [id number]    Asymptomatic COVID-19 Virus (Coronavirus) by PCR Nasopharyngeal    Collection Time: 02/08/22  1:41 AM    Specimen: Nasopharyngeal; Swab   Result Value Ref Range    SARS CoV2 PCR Negative Negative   ECG 12-LEAD WITH MUSE (LHE)    Collection Time: 02/08/22  2:17 AM   Result Value Ref Range    Systolic Blood Pressure  mmHg    Diastolic Blood Pressure  mmHg    Ventricular Rate 69 BPM    Atrial Rate 69 BPM    NH Interval 160 ms    QRS Duration 128 ms     ms    QTc 477 ms    P Axis 50 degrees    R AXIS -21 degrees    T Axis 92 degrees    Interpretation ECG       Sinus rhythm  Left ventricular hypertrophy with QRS widening and repolarization abnormality  Possible Lateral infarct , age undetermined  Abnormal ECG  No previous ECGs available  Confirmed by SEE ED PROVIDER NOTE FOR, ECG INTERPRETATION ([id number]),  INNA DOSHI  ([id number]) on 2/8/2022 11:30:35 AM     HCG QUALitative pregnancy (blood)    Collection Time: 02/08/22  3:54 AM   Result Value Ref Range    hCG Serum Qualitative Negative Negative   Comprehensive metabolic panel    Collection Time: 02/08/22  5:23 AM   Result Value Ref Range    Sodium 140 136 - 145 mmol/L    Potassium 3.9 3.5 - 5.0 mmol/L    Chloride 112 (H) 98 - 107 mmol/L    Carbon Dioxide (CO2) 20 (L) 22 - 31 mmol/L    Anion Gap 8 5 - 18 mmol/L    Urea Nitrogen 12 8 - 22 mg/dL    Creatinine 0.63 0.60 - 1.10 mg/dL    Calcium 8.3 (L) 8.5 - 10.5 mg/dL    Glucose 93 70 - 125 mg/dL    Alkaline Phosphatase 46 45 - 120 U/L    AST 20 0 - 40 U/L    ALT 17 0 - 45 U/L    Protein Total 6.0 6.0 - 8.0 g/dL    Albumin 3.3 (L) 3.5 - 5.0 g/dL    Bilirubin Total 0.9 0.0 - 1.0 mg/dL    GFR Estimate >90 >60 mL/min/1.73m2   Lipase    Collection Time: 02/08/22  5:23 AM   Result Value Ref Range    Lipase 77 (H) 0 - 52 U/L   CBC with platelets and differential    Collection Time: 02/08/22  5:23 AM   Result Value Ref Range    WBC Count 9.8 4.0 - 11.0 10e3/uL    RBC Count 4.22 3.80 - 5.20 10e6/uL    Hemoglobin 11.0 (L) 11.7 - 15.7 g/dL    Hematocrit 34.7 (L) 35.0 - 47.0 %    MCV 82 78 - 100 fL    MCH 26.1 (L) 26.5 - 33.0 pg    MCHC 31.7 31.5 - 36.5 g/dL    RDW 14.6 10.0 - 15.0 %    Platelet Count 256 150 - 450 10e3/uL    % Neutrophils 67 %    % Lymphocytes 23 %    % Monocytes 7 %    % Eosinophils 3 %    % Basophils 0 %    % Immature Granulocytes 0 %    NRBCs per 100 WBC 0 <1 /100    Absolute Neutrophils 6.5 1.6 - 8.3 10e3/uL    Absolute Lymphocytes 2.3 0.8 - 5.3 10e3/uL    Absolute Monocytes 0.7 0.0 - 1.3 10e3/uL    Absolute Eosinophils 0.3 0.0 - 0.7 10e3/uL    Absolute Basophils 0.0 0.0 - 0.2 10e3/uL    Absolute Immature Granulocytes 0.0 <=0.4 10e3/uL    Absolute NRBCs 0.0 10e3/uL     ____________  Interval History   Data reviewed today: I reviewed all medications, new labs and imaging results over the last 24 hours. I personally reviewed no  images or EKG's today.  Patient states doing fine. Pain has resolved. On IV PPI and IV Zosyn. NPO. Awaiting Gen Surg input.    PharmD noted that patient was taking ibuprofen at home, this may be reason for perf

## 2022-02-08 NOTE — CONSULTS
Care Management Initial Consult    General Information  Assessment completed with: Patient, pt  Type of CM/SW Visit: Initial Assessment    Primary Care Provider verified and updated as needed: Yes   Readmission within the last 30 days: no previous admission in last 30 days   Return Category: Progression of disease  Reason for Consult: discharge planning  Advance Care Planning: Advance Care Planning Reviewed: no concerns identified          Communication Assessment  Patient's communication style: spoken language (English or Bilingual)    Hearing Difficulty or Deaf: no   Wear Glasses or Blind: no    Cognitive  Cognitive/Neuro/Behavioral: WDL                      Living Environment:   People in home: parent(s)     Current living Arrangements: house      Able to return to prior arrangements: yes       Family/Social Support:  Care provided by: self  Provides care for: no one  Marital Status: Single  Parent(s)          Description of Support System: Supportive    Support Assessment: Adequate family and caregiver support    Current Resources:   Patient receiving home care services: Yes     Community Resources: None  Equipment currently used at home: none  Supplies currently used at home: None    Employment/Financial:  Employment Status:          Financial Concerns: No concerns identified   Referral to Financial Counselor: No       Lifestyle & Psychosocial Needs:  Social Determinants of Health     Tobacco Use: High Risk     Smoking Tobacco Use: Current Every Day Smoker     Smokeless Tobacco Use: Never Used   Alcohol Use: Not on file   Financial Resource Strain: Not on file   Food Insecurity: Not on file   Transportation Needs: Not on file   Physical Activity: Not on file   Stress: Not on file   Social Connections: Not on file   Intimate Partner Violence: Not on file   Depression: Not on file   Housing Stability: Not on file       Functional Status:  Prior to admission patient needed assistance:   Dependent ADLs::  Independent  Dependent IADLs:: Independent  Assesssment of Functional Status: Not at baseline with ADL Functioning    Mental Health Status:  Mental Health Status: No Current Concerns       Chemical Dependency Status:                Values/Beliefs:  Spiritual, Cultural Beliefs, Congregational Practices, Values that affect care:                 Additional Information:  NOHELIA assessed, independent and fam to transport. No svcs.      Sunni Hawkins RN

## 2022-02-08 NOTE — CONSULTS
General Surgery Consultation  Maria R Mcdaniel MRN# [id number]   Age/Sex: 46 year old female YOB: 1975     Reason for consult: 1. Perforation of intestine (H)    2. Idiopathic acute pancreatitis without infection or necrosis            Requesting physician: Martin Iniguez MD                   Assessment and Plan:   Assessment:  1.  Marginal ulcer vs localized perforation on CT scan  2.  Hx of gastric bypass    Plan:  -From the general surgery standpoint, we will continue to follow the patient.  No acute surgical intervention from our standpoint.  The patient continues to do well within 24 hours, my plan is to have her undergo an upper GI with small bowel follow-through to ensure that the patient does not have a perforation.     -Until the patient has a clear diagnosis of whether or not the patient has a true perforation, I would keep the patient n.p.o. at this time.  Continue resuscitate with IV fluids.  -Continue with med management per primary team.  -General surgery team will continue to follow with you.         Chief Complaint:     Chief Complaint   Patient presents with     Abdominal Pain        History is obtained from the patient    HPI:   Maria R Mcdaniel is a 46 year old female who presents to the emergency room from the urgency center.  Patient was found to have abdominal pain.  She was evaluated and underwent a CT scan and found to have a marginal ulcer versus a localized perforation.  The patient had an x-ray at [facility] and was found to have no intra-abdominal free air.    General surgery team saw the patient in the emergency room.  Patient states that currently she has no abdominal pain.  She has no nausea no vomiting.  Patient also states that she has never had any symptoms like this before.  The patient also does admit that she has had intermittent sharp pains in the upper quadrants of her abdomen days prior to going to the urgency care.  Patient has no chest pain fevers or chills  at this time.  Patient does have a significant history of gastric bypass in the past.          Past Medical History:   History reviewed. No pertinent past medical history.           Past Surgical History:     Past Surgical History:   Procedure Laterality Date     ZZC GASTRIC BYPASS,OBESE<100CM RODY-EN-Y      Description: Gastric Surgery For Morbid Obesity Bypass With Rody-en-Y;  Recorded: 09/16/2008;             Social History:    reports that she has been smoking cigarettes and cigarettes. She has never used smokeless tobacco. She reports current alcohol use.           Family History:   History reviewed. No pertinent family history.           Allergies:   No Known Allergies           Medications:     Prior to Admission medications    Medication Sig Start Date End Date Taking? Authorizing Provider   amLODIPine (NORVASC) 10 MG tablet Take 10 mg by mouth daily  8/3/18  Yes Provider, Historical   DULoxetine (CYMBALTA) 30 MG capsule Take 30 mg by mouth daily Total dose is 90 mg   Yes Unknown, Entered By History   DULoxetine (CYMBALTA) 60 MG capsule Take 60 mg by mouth daily Total dose is 90 mg 12/9/14  Yes Provider, Historical   ibuprofen (ADVIL/MOTRIN) 800 MG tablet Take 800 mg by mouth every 8 hours as needed for moderate pain   Yes Unknown, Entered By History   levothyroxine (SYNTHROID/LEVOTHROID) 50 MCG tablet Take 50 mcg by mouth daily  12/9/14  Yes Provider, Historical   losartan (COZAAR) 100 MG tablet Take 100 mg by mouth daily  12/9/14  Yes Provider, Historical   MULTIPLE VITAMIN PO Take 1 tablet by mouth daily   Yes Reported, Patient              Review of Systems:   The Review of Systems is negative other than noted in the HPI            Physical Exam:     Patient Vitals for the past 24 hrs:   BP Temp Temp src Pulse Resp SpO2 Weight   02/08/22 1100 124/78 -- -- 62 -- 95 % --   02/08/22 1000 113/66 -- -- 62 -- 96 % --   02/08/22 0900 110/68 -- -- 61 -- 94 % --   02/08/22 0800 108/69 -- -- 63 -- 96 % --    02/08/22 0700 123/67 -- -- 61 -- 95 % --   02/08/22 0625 -- -- -- 59 -- 95 % --   02/08/22 0600 103/57 -- -- 61 -- 95 % --   02/08/22 0530 -- -- -- 63 -- 96 % --   02/08/22 0500 125/65 -- -- 62 -- 96 % --   02/08/22 0455 -- -- -- 63 -- 96 % --   02/08/22 0425 -- -- -- 66 -- 96 % --   02/08/22 0410 113/63 -- -- 63 -- 97 % --   02/08/22 0330 (!) 145/65 -- -- 64 -- 95 % --   02/08/22 0300 126/76 -- -- 66 -- 99 % --   02/08/22 0230 121/68 -- -- 65 -- 96 % --   02/08/22 0200 130/71 -- -- 66 -- 97 % --   02/08/22 0130 127/74 -- -- 64 -- 98 % --   02/08/22 0100 -- -- -- 64 -- 99 % --   02/07/22 2137 (!) 156/85 97.2  F (36.2  C) Temporal 92 17 100 % 90.7 kg (200 lb)        No intake or output data in the 24 hours ending 02/08/22 1219   Constitutional:   awake, alert, cooperative, no apparent distress, and appears stated age       Eyes:   PERRL, conjunctiva/corneas clear, EOM's intact; no scleral edema or icterus noted        ENT:   Normocephalic, without obvious abnormality, atraumatic, Lips, mucosa, and tongue normal        Hematologic / Lymphatic:   No lymphadenopathy       Lungs:   Normal respiratory effort, no accessory muscle use       Cardiovascular:   Regular rate and rhythm       Abdomen:   Abdomen is soft, nondistended, nontender to palpation.       Musculoskeletal:   No obvious swelling, bruising or deformity       Skin:   Skin color and texture normal for patient, no rashes or lesions              Data:         All imaging studies reviewed by me.    Results for orders placed or performed during the hospital encounter of 02/08/22 (from the past 24 hour(s))   Lactic acid whole blood   Result Value Ref Range    Lactic Acid 0.5 (L) 0.7 - 2.0 mmol/L   Chest XR,  PA & LAT    Narrative    EXAM: XR CHEST 2 VW  LOCATION: [facility]  DATE/TIME: 2/8/2022 1:14 AM    INDICATION: Preoperative evaluation.  COMPARISON: None.      Impression    IMPRESSION: Normal heart size and pulmonary vascularity.  Lungs clear. No pneumothorax or pleural fluid. Degenerative changes both shoulders and within the spine. Postsurgical changes upper abdomen.   ABO/Rh type and screen    Narrative    The following orders were created for panel order ABO/Rh type and screen.  Procedure                               Abnormality         Status                     ---------                               -----------         ------                     Adult Type and Screen[[id number]]                            Edited Result - FINAL        Please view results for these tests on the individual orders.   Adult Type and Screen   Result Value Ref Range    ABO/RH(D) O POS     Antibody Screen Negative Negative    SPECIMEN EXPIRATION DATE [id number]    Asymptomatic COVID-19 Virus (Coronavirus) by PCR Nasopharyngeal    Specimen: Nasopharyngeal; Swab   Result Value Ref Range    SARS CoV2 PCR Negative Negative    Narrative    Testing was performed using the lenore  SARS-CoV-2 & Influenza A/B Assay on the lenore  Mandy  System.  This test should be ordered for the detection of SARS-COV-2 in individuals who meet SARS-CoV-2 clinical and/or epidemiological criteria. Test performance is unknown in asymptomatic patients.  This test is for in vitro diagnostic use under the FDA EUA for laboratories certified under CLIA to perform moderate and/or high complexity testing. This test has not been FDA cleared or approved.  A negative test does not rule out the presence of PCR inhibitors in the specimen or target RNA in concentration below the limit of detection for the assay. The possibility of a false negative should be considered if the patient's recent exposure or clinical presentation suggests COVID-19.  [facility] are certified under the Clinical Laboratory Improvement Amendments of 1988 (CLIA-88) as qualified to perform moderate and/or high complexity laboratory testing.   ECG 12-LEAD WITH MUSE (LHE)   Result Value Ref Range    Systolic  Blood Pressure  mmHg    Diastolic Blood Pressure  mmHg    Ventricular Rate 69 BPM    Atrial Rate 69 BPM    MI Interval 160 ms    QRS Duration 128 ms     ms    QTc 477 ms    P Axis 50 degrees    R AXIS -21 degrees    T Axis 92 degrees    Interpretation ECG       Sinus rhythm  Left ventricular hypertrophy with QRS widening and repolarization abnormality  Possible Lateral infarct , age undetermined  Abnormal ECG  No previous ECGs available  Confirmed by SEE ED PROVIDER NOTE FOR, ECG INTERPRETATION ([id number]),  NINA DOSHI ([id number]) on 2/8/2022 11:30:35 AM     HCG QUALitative pregnancy (blood)   Result Value Ref Range    hCG Serum Qualitative Negative Negative   CBC with platelets differential    Narrative    The following orders were created for panel order CBC with platelets differential.  Procedure                               Abnormality         Status                     ---------                               -----------         ------                     CBC with platelets and d...[[id number]]  Abnormal            Final result                 Please view results for these tests on the individual orders.   Comprehensive metabolic panel   Result Value Ref Range    Sodium 140 136 - 145 mmol/L    Potassium 3.9 3.5 - 5.0 mmol/L    Chloride 112 (H) 98 - 107 mmol/L    Carbon Dioxide (CO2) 20 (L) 22 - 31 mmol/L    Anion Gap 8 5 - 18 mmol/L    Urea Nitrogen 12 8 - 22 mg/dL    Creatinine 0.63 0.60 - 1.10 mg/dL    Calcium 8.3 (L) 8.5 - 10.5 mg/dL    Glucose 93 70 - 125 mg/dL    Alkaline Phosphatase 46 45 - 120 U/L    AST 20 0 - 40 U/L    ALT 17 0 - 45 U/L    Protein Total 6.0 6.0 - 8.0 g/dL    Albumin 3.3 (L) 3.5 - 5.0 g/dL    Bilirubin Total 0.9 0.0 - 1.0 mg/dL    GFR Estimate >90 >60 mL/min/1.73m2   Lipase   Result Value Ref Range    Lipase 77 (H) 0 - 52 U/L   CBC with platelets and differential   Result Value Ref Range    WBC Count 9.8 4.0 - 11.0 10e3/uL    RBC Count 4.22 3.80 - 5.20 10e6/uL    Hemoglobin 11.0  (L) 11.7 - 15.7 g/dL    Hematocrit 34.7 (L) 35.0 - 47.0 %    MCV 82 78 - 100 fL    MCH 26.1 (L) 26.5 - 33.0 pg    MCHC 31.7 31.5 - 36.5 g/dL    RDW 14.6 10.0 - 15.0 %    Platelet Count 256 150 - 450 10e3/uL    % Neutrophils 67 %    % Lymphocytes 23 %    % Monocytes 7 %    % Eosinophils 3 %    % Basophils 0 %    % Immature Granulocytes 0 %    NRBCs per 100 WBC 0 <1 /100    Absolute Neutrophils 6.5 1.6 - 8.3 10e3/uL    Absolute Lymphocytes 2.3 0.8 - 5.3 10e3/uL    Absolute Monocytes 0.7 0.0 - 1.3 10e3/uL    Absolute Eosinophils 0.3 0.0 - 0.7 10e3/uL    Absolute Basophils 0.0 0.0 - 0.2 10e3/uL    Absolute Immature Granulocytes 0.0 <=0.4 10e3/uL    Absolute NRBCs 0.0 10e3/uL        DO Villa Lopez DO  General Surgeon  [facility]  Surgery [address] [address]?  Office: [phone]  Employed by - [employer]  Pager: [phone]  Cell: [phone]

## 2022-02-08 NOTE — PHARMACY-ADMISSION MEDICATION HISTORY
Pharmacy Note - Admission Medication History    Pertinent Provider Information: she has been taking ibuprofen 800 mg four to five times daily for the previous one week.      ______________________________________________________________________    Prior To Admission (PTA) med list completed and updated in EMR.       PTA Med List   Medication Sig Last Dose     amLODIPine (NORVASC) 10 MG tablet Take 10 mg by mouth daily  2/7/2022 at am     DULoxetine (CYMBALTA) 30 MG capsule Take 30 mg by mouth daily Total dose is 90 mg 2/7/2022 at am     DULoxetine (CYMBALTA) 60 MG capsule Take 60 mg by mouth daily Total dose is 90 mg 2/7/2022 at am     ibuprofen (ADVIL/MOTRIN) 800 MG tablet Take 800 mg by mouth every 8 hours as needed for moderate pain 2/7/2022 at pm     levothyroxine (SYNTHROID/LEVOTHROID) 50 MCG tablet Take 50 mcg by mouth daily  2/7/2022 at am     losartan (COZAAR) 100 MG tablet Take 100 mg by mouth daily  2/7/2022 at am     MULTIPLE VITAMIN PO Take 1 tablet by mouth daily 2/7/2022 at am       Information source(s): Patient, Clinic records and [facility]  Method of interview communication: in-person    Summary of Changes to PTA Med List  New: nothing   Discontinued: nothing   Changed: nothing    Patient was asked about OTC/herbal products specifically.  PTA med list reflects this.    In the past week, patient estimated taking medication this percent of the time:  greater than 90%.    Allergies were reviewed, assessed, and updated with the patient.      Patient does not use any multi-dose medications prior to admission.    The information provided in this note is only as accurate as the sources available at the time of the update(s).    Thank you for the opportunity to participate in the care of this patient.    Yosvany Christianson [facility]  2/7/2022 10:28 PM

## 2022-02-08 NOTE — ED TRIAGE NOTES
Patient presents from [facility].  Seen there this evening for abdominal pain.  Had abnormal CT and elevated lipase.  Sent here for further evaluation of this.  Patient reports upper abdominal pain with nausea without vomiting.  Denies any urinary symptoms.

## 2022-02-08 NOTE — ED NOTES
ER Boarding Note:    Alert and oriented.  Able to make needs known.  On RA.  No distress.  Sats mid 90s.  No CP, HR 60.  NPO.  AP 5/10.  No N/V.  Has protonix gtt and D5 1/2NS@75mL/hr.  Waiting for surgery consult.  Up to bathroom.  VS: /69   Pulse 63   Temp 97.2  F (36.2  C) (Temporal)   Resp 17   Wt 90.7 kg (200 lb)   SpO2 96%   BMI 33.80 kg/m  .  Plan: waiting for admission bed.

## 2022-02-08 NOTE — ED NOTES
[facility] ED Handoff Report    ED Chief Complaint: Abd pain    ED Diagnosis:  (K63.1) Perforation of intestine (H)  Comment: possible marginal perforation vs ulcer  Plan: NPO, zosyn IV, surgery to re-evaluate tomorrow and possible gastrografin study tomorrow    (K85.00) Idiopathic acute pancreatitis without infection or necrosis  Comment:   Plan: NPO       PMH:  History reviewed. No pertinent past medical history.     Code Status:  Full Code     Falls Risk: No Band: Not applicable    Current Living Situation/Residence: lives in a house     Elimination Status: Continent: Yes     Activity Level: Independent    Patients Preferred Language:  English     Needed: No    Vital Signs:  /68   Pulse 61   Temp 97.2  F (36.2  C) (Temporal)   Resp 17   Wt 90.7 kg (200 lb)   SpO2 95%   BMI 33.80 kg/m       Cardiac Rhythm: NA    Pain Score: 0/10    Is the Patient Confused:  No    Last Food or Drink: 2/7/22 at 1400    Focused Assessment: Pt alert and oriented, ambulates independently. Pt c/o intermittent upper abd pain x 1 week with nausea but no vomiting. Last BM was 2 days ago. Pt denies pain or nausea currently, remains NPO. Pt resting comfortably in bed.    Tests Performed: Done: Labs and Imaging    Treatments Provided: IV fluids, zosyn IV, nausea and pain medication PRN    Family Dynamics/Concerns: No    Family Updated On Visitor Policy: Yes    Plan of Care Communicated to Family: Yes    Who Was Updated about Plan of Care: mom    Belongings Checklist Done and Signed by Patient: Yes    Medications sent with patient: None    Covid: asymptomatic , negative    Additional Information: NPO, possible gastrografin study tomorrow     RN: Cherise Eddy   2/8/2022 3:52 PM

## 2022-02-08 NOTE — UTILIZATION REVIEW
Inpatient appropriate  Admission Status; Secondary Review Determination     Under the authority of the Utilization Management Committee, the utilization review process indicated a secondary review on the above patient. The review outcome is based on review of the medical records, discussions with staff, and applying clinical experience noted on the date of the review.     (x) Inpatient Status Appropriate - This patient's medical care is consistent with medical management for inpatient care and reasonable inpatient medical practice.     RATIONALE FOR DETERMINATION   46 years old female evaluated at outside emergency room with abdominal pain and sent to the ED for evaluation of abnormal CT scan findings.  Past medical history of Rody-en-Y gastric bypass.  CT scan showed large amount of soft tissue stranding along the anterior gastric pouch/Rody limb anastomosis as well as surrounding the proximal Rody limb; prominent region of air along the anterior anastomosis somewhat obscured by motion.  Diagnostic considerations include advanced marginal ulceration versus localized perforation.  Laboratory work-up remarkable for lipase 482.  Evaluated by general surgeon and recommended nothing by mouth, IV fluid resuscitation, pain control, upper GI with small bowel follow-through if continues to do well in 24 hours to rule out perforation.  At the time of admission with the information available to the attending physician more than 2 nights Hospital complex care was anticipated, based on patient risk of adverse outcome if treated as outpatient and complex care required. Inpatient admission is appropriate based on the Medicare guidelines.     This document was produced using voice recognition software     The information on this document is developed by the utilization review team in order for the business office to ensure compliance. This only denotes the appropriateness of proper admission status and does not reflect the quality  of care rendered.   The definitions of Inpatient Status and Observation Status used in making the determination above are those provided in the CMS Coverage Manual, Chapter 1 and Chapter 6, section 70.4.     Sincerely,     Peterson Thornton MD  [facility]  Utilization Review Physician Advisor  Pager: [phone]

## 2022-02-08 NOTE — ED PROVIDER NOTES
EMERGENCY DEPARTMENT ENCOUnter      NAME: Maria R Mcdaniel  AGE: 46 year old female  YOB: 1975  MRN: [id number]  EVALUATION DATE & TIME: No admission date for patient encounter.    PCP: No Ref-Primary, Physician    ED PROVIDER: Meghana Fox MD      Chief Complaint   Patient presents with     Abdominal Pain         FINAL IMPRESSION:  1. Perforation of intestine (H)    2. Idiopathic acute pancreatitis without infection or necrosis          ED COURSE & MEDICAL DECISION MAKING:      In summary, the patient is a 46-year-old female that presents to the emergency department for evaluation of abdominal pain thought secondary to a perforation around her Rody-en-Y.  Her lipase is elevated, however there is no evidence of pancreatitis on the CT scan and clinically she does not seem to have pancreatitis.  We will admit to the hospital for further care and evaluation.    11:22 PM I met with the patient, obtained history, performed an initial exam, and discussed options and plan for diagnostics and treatment here in the ED. PPE worn including N95 mask, surgical gloves, surgical cap, eye protection.Zosyn 3.375 g IV administered for initiation of antibiotic therapy for perforated bowel.  Reviewed previous medical records.  11:44 PM I spoke with the hospitalist Dr. Iniguez and he accepts the patient for admission.  12:40 AM I spoke with Dr. Silvestre from General Surgery.  He recommends initiation of a continuous PPI infusion and surgery will evaluate later today.  0150-morphine 4 mg IV was administered for pain.  Zofran 4 mg IV was administered for nausea.    At the conclusion of the encounter I discussed the results of all of the tests and the disposition. The questions were answered. The patient or family acknowledged understanding and was agreeable with the care plan.         MEDICATIONS GIVEN IN THE EMERGENCY:  Medications   pantoprazole (PROTONIX) 80 mg in sodium chloride 0.9 % 100 mL infusion (8 mg/hr  Intravenous New Bag 2/8/22 0234)   hydrALAZINE (APRESOLINE) injection 10 mg (has no administration in time range)   piperacillin-tazobactam (ZOSYN) 3.375 g vial to attach to  mL bag (0 g Intravenous Stopped 2/8/22 0230)   sodium chloride 0.9% infusion ( Intravenous Rate/Dose Verify 2/8/22 0226)   morphine (PF) injection 4 mg (4 mg Intravenous Given 2/8/22 0150)   ondansetron (ZOFRAN) injection 4 mg (4 mg Intravenous Given 2/8/22 0147)       NEW PRESCRIPTIONS STARTED AT TODAY'S ER VISIT  New Prescriptions    No medications on file          =================================================================    HPI        Maria R Mcdaniel is a 46 year old female with a pertinent history of s/p gastric bypass (2008), hypertension who presents to this ED by walk in for evaluation of abdominal pain. Patient reports developing intermittent abdominal pain 1 week ago. Pain is localized to her epigastric region without any radiation. She rates her current pain at 6/10. Patient states she has been having some constipation for which she took Miralax for. Patient's last bowel movement was yesterday, but states it was very small. [facility] today for evaluation of her abdominal pain and was sent to this ED for further evaluation/admission.    Patient notes baseline nausea which has not changed since her symptoms onset. Denies any additional pertinent medical history including diabetes mellitus type 2, heart problems, lung problems. She is currently taking medications for her hypertension, Cymbalta, synthroid.    Denies vomiting, urinary problems, fever, chills.     SHx- Endorses tobacco use. Denies alcohol use. Patient is currently working as a manager at a restaurant.    Per chart review, patient was seen at [facility] [place] on 02/07/2022 (earlier today) for the evaluation of abdominal pain. CT abdomen pelvis with contrast resulted Rody-en-Y gastric bypass. Motion artifact is  present. Large amount of soft tissue stranding along the anterior gastric pouch/Rody limb anastomosis as well as surrounding the proximal Rody limb. There is a prominent region of air along the anterior anastomosis somewhat obscured by motion. Diagnostic considerations include advanced marginal ulceration versus localized perforation. WBCs normal. Patient sent to the ED given concerns for perforation.          REVIEW OF SYSTEMS     Constitutional:  Denies fever or chills  HENT:  Denies sore throat   Respiratory:  Denies cough or shortness of breath   Cardiovascular:  Denies chest pain or palpitations  GI:  Positive for abdominal pain (epigastric, intermittent), nausea (intermittent, baseline). Denies vomiting  Musculoskeletal:  Denies any new extremity pain   Skin:  Denies rash   Neurologic:  Denies headache, focal weakness or sensory changes    All other systems reviewed and are negative      PAST MEDICAL HISTORY:  hypothyroid  PAST SURGICAL HISTORY:  Past Surgical History:   Procedure Laterality Date     ZZC GASTRIC BYPASS,OBESE<100CM RODY-EN-Y      Description: Gastric Surgery For Morbid Obesity Bypass With Rody-en-Y;  Recorded: 09/16/2008;           CURRENT MEDICATIONS:    amLODIPine (NORVASC) 10 MG tablet  DULoxetine (CYMBALTA) 30 MG capsule  DULoxetine (CYMBALTA) 60 MG capsule  ibuprofen (ADVIL/MOTRIN) 800 MG tablet  levothyroxine (SYNTHROID/LEVOTHROID) 50 MCG tablet  losartan (COZAAR) 100 MG tablet  MULTIPLE VITAMIN PO        ALLERGIES:  No Known Allergies    FAMILY HISTORY:  History reviewed. No pertinent family history.    SOCIAL HISTORY:   Social History     Socioeconomic History     Marital status: Single     Spouse name: None     Number of children: None     Years of education: None     Highest education level: None   Occupational History     None   Tobacco Use     Smoking status: Current Every Day Smoker     Types: Cigarettes, Cigarettes     Smokeless tobacco: Never Used   Substance and Sexual Activity      Alcohol use: Yes     Comment: Alcoholic Drinks/day: occasionally      Drug use: None     Sexual activity: None   Other Topics Concern     None   Social History Narrative     None     Social Determinants of Health     Financial Resource Strain: Not on file   Food Insecurity: Not on file   Transportation Needs: Not on file   Physical Activity: Not on file   Stress: Not on file   Social Connections: Not on file   Intimate Partner Violence: Not on file   Housing Stability: Not on file       VITALS:  Patient Vitals for the past 24 hrs:   BP Temp Temp src Pulse Resp SpO2 Weight   02/08/22 0200 130/71 -- -- 66 -- 97 % --   02/08/22 0130 127/74 -- -- 64 -- 98 % --   02/08/22 0100 -- -- -- 64 -- 99 % --   02/07/22 2137 (!) 156/85 97.2  F (36.2  C) Temporal 92 17 100 % 90.7 kg (200 lb)       PHYSICAL EXAM    Constitutional:  obese,  HENT:  Normocephalic, Atraumatic, Bilateral external ears normal, Oropharynx moist, Nose normal.   Neck:  Normal range of motion, No meningismus, No stridor.   Eyes:  EOMI, Conjunctiva normal, No discharge.   Respiratory:  Normal breath sounds, No respiratory distress, No wheezing, No chest tenderness.   Cardiovascular:  Normal heart rate, Normal rhythm, No murmurs  GI:  Soft,mild epigastric tenderness, No guarding, No CVA tenderness.   Musculoskeletal:  Neurovascularly intact distally, No edema, No tenderness, No cyanosis, Good range of motion in all major joints. No tenderness to palpation or major deformities noted.   Integument:  Warm, Dry, No erythema, No rash.   Lymphatic:  No lymphadenopathy noted.   Neurologic:  Alert & oriented x 3, Normal motor function, \ No focal deficits noted.   Psychiatric:  Affect normal, Judgment normal, Mood normal.      LAB:  All pertinent labs reviewed and interpreted.  Results for orders placed or performed during the hospital encounter of 02/08/22   Chest XR,  PA & LAT    Impression    IMPRESSION: Normal heart size and pulmonary vascularity. Lungs clear.  No pneumothorax or pleural fluid. Degenerative changes both shoulders and within the spine. Postsurgical changes upper abdomen.   Lactic acid whole blood   Result Value Ref Range    Lactic Acid 0.5 (L) 0.7 - 2.0 mmol/L   Asymptomatic COVID-19 Virus (Coronavirus) by PCR Nasopharyngeal    Specimen: Nasopharyngeal; Swab   Result Value Ref Range    SARS CoV2 PCR Negative Negative   Adult Type and Screen   Result Value Ref Range    ABO/RH(D) O POS     Antibody Screen Negative Negative    SPECIMEN EXPIRATION DATE [id number]      From the [facility] [place] 02/07/2022-    (ABNORMAL) CBC WITH AUTO DIFFERENTIAL (02/07/2022 6:35 PM CST)  (ABNORMAL) CBC WITH AUTO DIFFERENTIAL (02/07/2022 6:35 PM CST)   Component Value Ref Range   WHITE BLOOD COUNT  11.3 (H) 4.6 - 10.2 thou/cu mm   RED BLOOD COUNT  4.60 4.04 - 6.13 mil/cu mm   HEMOGLOBIN  12.9 12.2 - 18.1 g/dL   HEMATOCRIT  38.0 37.7 - 53.7 %   MCV  83 80 - 97 fL   MCH  28.0 27.0 - 31.2 pg   MCHC  33.9 31.8 - 35.4 g/dL   RDW  15.0 (H) 11.6-<14.8 %   PLATELET COUNT  297 142 - 424 thou/cu mm   MPV  8.0 6.5 - 11.0 fL   NEUTROPHILS  71.6 37.0 - 80.0 %   LYMPHOCYTES  18.9 10.0 - 50.0 %   MONOCYTES  7.0 <=12.0 %   EOSINOPHILS  2.3 <=7.0 %   BASOPHILS  0.2 <=2.5 %   ABSOLUTE NEUTROPHILS  8.1 (H) 2.0 - 6.9 thou/cu mm   ABSOLUTE LYMPHOCYTES  2.1 0.6 - 3.4 thou/cu mm   ABSOLUTE MONOCYTES  0.8 <=0.9 thou/cu mm   ABSOLUTE EOSINOPHILS  0.3 <=0.7 thou/cu mm   ABSOLUTE BASOPHILS  0.0 <=0.3 thou/cu mm     (ABNORMAL) LIPASE (02/07/2022 6:35 PM CST)  (ABNORMAL) LIPASE (02/07/2022 6:35 PM CST)   Component Value Ref Range   LIPASE 482 (H) 23 - 300 IU/L         (ABNORMAL) COMP METABOLIC PANEL (02/07/2022 6:35 PM CST)   (ABNORMAL) COMP METABOLIC PANEL (02/07/2022 6:35 PM CST)   Component Value Ref Range   SODIUM 138 137 - 145 mmol/L   POTASSIUM 3.9 3.5 - 5.1 mmol/L   CHLORIDE 109 (H) 98 - 107 mmol/L   CO2,TOTAL 23 22 - 30 mmol/L   ANION GAP 6 (L) 8 - 12   GLUCOSE,RANDOM 118 (H) 74 - 106 mg/dL    CALCIUM 9.2 8.4 - 10.2 mg/dL   BUN 23 (H) 7 - 17 mg/dL   CREATININE 0.70 0.52 - 1.04 mg/dL   BUN/CREAT RATIO 33 (H) 10 - 20   GFR if African American >60 >60 ml/min/1.73m2   GFR if not African American >60 >60 ml/min/1.73m2   ALBUMIN 4.4 3.5 - 5.0 g/dL   PROTEIN,TOTAL 7.4 6.3 - 8.2 g/dL   GLOBULIN 3.0 2.3 - 3.5 g/dL   A/G RATIO 1.5 (L) 1.7 - 2.2   BILIRUBIN,TOTAL 0.8 0.2 - 1.3 mg/dL   ALK PHOSPHATASE 60 38 - 126 IU/L   AST (SGOT) 23 15 - 46 IU/L   ALTv (SGPT) 17 <35 U/L         RADIOLOGY:    CT abdomen/pelvis from UR:    EXAM: CT ABDOMEN PELVIS W  LOCATION: [facility] [place]  DATE/TIME: 2/7/2022 6:50 PM    INDICATION: Abdominal pain, acute, nonlocalized.  COMPARISON: None.  TECHNIQUE: CT scan of the abdomen and pelvis was performed following injection of IV contrast. Multiplanar reformats were obtained. Dose reduction techniques were used.  CONTRAST: IOPAMIDOL 300 MG/ML  ML BOTTLE: 100mL    FINDINGS:   LOWER CHEST: Normal.    HEPATOBILIARY: Cholecystectomy.    PANCREAS: Normal.    SPLEEN: Normal.    ADRENAL GLANDS: Normal.    KIDNEYS/BLADDER: Simple cysts within the kidneys. No follow-up is needed. There are a few small nonobstructing renal stones present.    BOWEL: Rody-en-Y gastric bypass. There is large amount of soft tissue stranding anterior to the gastric pouch/Rody limb anastomosis and surrounding the proximal Rody limb with motion artifact. Prominent region of air at the proximal anastomosis and findings most consistent with either advanced marginal ulceration or localized perforation.    LYMPH NODES: Normal.    VASCULATURE: Unremarkable.    PELVIC ORGANS: Small fat-containing lower ventral abdominal wall hernia.    MUSCULOSKELETAL: Normal.    IMPRESSION:   Rody-en-Y gastric bypass. Motion artifact is present. Large amount of soft tissue stranding along the anterior gastric pouch/Rody limb anastomosis as well as surrounding the proximal Rody limb. There is a prominent region of air along the  anterior anastomosis somewhat obscured by motion. Diagnostic considerations include advanced marginal ulceration versus localized perforation.     NOTE: ABNORMAL REPORT    THE DICTATION ABOVE DESCRIBES AN ABNORMALITY FOR WHICH FOLLOW-UP IS NEEDED.  EK-rate is 69, sinus, there is no ST segment elevation or depression appreciated.  LVH with QRS widening    I have independently reviewed and interpreted this EKG          I, Chacorta Lay, am serving as a scribe to document services personally performed by Dr. Fox based on my observation and the provider's statements to me. I, Meghana Fox MD attest that Chacorta Lay is acting in a scribe capacity, has observed my performance of the services and has documented them in accordance with my direction.    Meghana Fox MD  Emergency Medicine  [facility] EMERGENCY DEPARTMENT  [facility] [facility] [address]  [phone]  Dept: [phone]     Meghana Fox MD  22 [phone]

## 2022-02-09 ENCOUNTER — APPOINTMENT (OUTPATIENT)
Dept: RADIOLOGY | Facility: HOSPITAL | Age: 47
DRG: 393 | End: 2022-02-09
Attending: SURGERY
Payer: COMMERCIAL

## 2022-02-09 VITALS
DIASTOLIC BLOOD PRESSURE: 70 MMHG | SYSTOLIC BLOOD PRESSURE: 113 MMHG | BODY MASS INDEX: 33.07 KG/M2 | RESPIRATION RATE: 18 BRPM | OXYGEN SATURATION: 99 % | HEIGHT: 65 IN | WEIGHT: 198.5 LBS | TEMPERATURE: 97.9 F | HEART RATE: 66 BPM

## 2022-02-09 LAB
ANION GAP SERPL CALCULATED.3IONS-SCNC: 7 MMOL/L (ref 5–18)
BUN SERPL-MCNC: 8 MG/DL (ref 8–22)
CALCIUM SERPL-MCNC: 8.6 MG/DL (ref 8.5–10.5)
CHLORIDE BLD-SCNC: 111 MMOL/L (ref 98–107)
CO2 SERPL-SCNC: 23 MMOL/L (ref 22–31)
CREAT SERPL-MCNC: 0.64 MG/DL (ref 0.6–1.1)
ERYTHROCYTE [DISTWIDTH] IN BLOOD BY AUTOMATED COUNT: 14.5 % (ref 10–15)
GFR SERPL CREATININE-BSD FRML MDRD: >90 ML/MIN/1.73M2
GLUCOSE BLD-MCNC: 90 MG/DL (ref 70–125)
HCT VFR BLD AUTO: 36.1 % (ref 35–47)
HGB BLD-MCNC: 11.3 G/DL (ref 11.7–15.7)
MCH RBC QN AUTO: 26.2 PG (ref 26.5–33)
MCHC RBC AUTO-ENTMCNC: 31.3 G/DL (ref 31.5–36.5)
MCV RBC AUTO: 84 FL (ref 78–100)
PLATELET # BLD AUTO: 258 10E3/UL (ref 150–450)
POTASSIUM BLD-SCNC: 3.8 MMOL/L (ref 3.5–5)
RBC # BLD AUTO: 4.31 10E6/UL (ref 3.8–5.2)
SODIUM SERPL-SCNC: 141 MMOL/L (ref 136–145)
WBC # BLD AUTO: 7.2 10E3/UL (ref 4–11)

## 2022-02-09 PROCEDURE — 250N000011 HC RX IP 250 OP 636: Performed by: STUDENT IN AN ORGANIZED HEALTH CARE EDUCATION/TRAINING PROGRAM

## 2022-02-09 PROCEDURE — 99207 PR NO CHARGE LOS: CPT | Performed by: PHYSICIAN ASSISTANT

## 2022-02-09 PROCEDURE — 250N000011 HC RX IP 250 OP 636: Performed by: EMERGENCY MEDICINE

## 2022-02-09 PROCEDURE — 99239 HOSP IP/OBS DSCHRG MGMT >30: CPT | Performed by: HOSPITALIST

## 2022-02-09 PROCEDURE — 99231 SBSQ HOSP IP/OBS SF/LOW 25: CPT | Performed by: SURGERY

## 2022-02-09 PROCEDURE — 80048 BASIC METABOLIC PNL TOTAL CA: CPT | Performed by: HOSPITALIST

## 2022-02-09 PROCEDURE — C9113 INJ PANTOPRAZOLE SODIUM, VIA: HCPCS | Performed by: EMERGENCY MEDICINE

## 2022-02-09 PROCEDURE — 36415 COLL VENOUS BLD VENIPUNCTURE: CPT | Performed by: HOSPITALIST

## 2022-02-09 PROCEDURE — 74240 X-RAY XM UPR GI TRC 1CNTRST: CPT

## 2022-02-09 PROCEDURE — 258N000003 HC RX IP 258 OP 636: Performed by: EMERGENCY MEDICINE

## 2022-02-09 PROCEDURE — 74248 X-RAY SM INT F-THRU STD: CPT

## 2022-02-09 PROCEDURE — 85027 COMPLETE CBC AUTOMATED: CPT | Performed by: HOSPITALIST

## 2022-02-09 PROCEDURE — 258N000001 HC RX 258: Performed by: STUDENT IN AN ORGANIZED HEALTH CARE EDUCATION/TRAINING PROGRAM

## 2022-02-09 RX ORDER — PANTOPRAZOLE SODIUM 40 MG/1
40 TABLET, DELAYED RELEASE ORAL
Status: DISCONTINUED | OUTPATIENT
Start: 2022-02-09 | End: 2022-02-09 | Stop reason: HOSPADM

## 2022-02-09 RX ORDER — PANTOPRAZOLE SODIUM 20 MG/1
40 TABLET, DELAYED RELEASE ORAL
Qty: 180 TABLET | Refills: 0 | Status: SHIPPED | OUTPATIENT
Start: 2022-02-09 | End: 2022-05-10

## 2022-02-09 RX ORDER — OMEPRAZOLE 20 MG/1
40 TABLET, DELAYED RELEASE ORAL DAILY
Qty: 30 TABLET | Refills: 1 | Status: SHIPPED | OUTPATIENT
Start: 2022-02-09 | End: 2022-02-09

## 2022-02-09 RX ADMIN — DIATRIZOATE MEGLUMINE AND DIATRIZOATE SODIUM 120 ML: 660; 100 SOLUTION ORAL; RECTAL at 09:21

## 2022-02-09 RX ADMIN — DEXTROSE AND SODIUM CHLORIDE: 5; 450 INJECTION, SOLUTION INTRAVENOUS at 03:57

## 2022-02-09 RX ADMIN — PIPERACILLIN AND TAZOBACTAM 3.38 G: 3; .375 INJECTION, POWDER, LYOPHILIZED, FOR SOLUTION INTRAVENOUS at 10:11

## 2022-02-09 RX ADMIN — SODIUM CHLORIDE 8 MG/HR: 9 INJECTION, SOLUTION INTRAVENOUS at 00:02

## 2022-02-09 RX ADMIN — PIPERACILLIN AND TAZOBACTAM 3.38 G: 3; .375 INJECTION, POWDER, LYOPHILIZED, FOR SOLUTION INTRAVENOUS at 00:02

## 2022-02-09 ASSESSMENT — ACTIVITIES OF DAILY LIVING (ADL)
ADLS_ACUITY_SCORE: 4

## 2022-02-09 NOTE — PLAN OF CARE
Problem: Adult Inpatient Plan of Care  Goal: Optimal Comfort and Wellbeing  Outcome: Improving  Pt denied pain/nausea. States she is passing gas. BS hypoactive. Ambulates to bathroom with standby assistance. VSS. Xray gastrografin upper GI and SBFT scheduled for tomorrow AM.

## 2022-02-09 NOTE — PROGRESS NOTES
Care Management Discharge Note    Discharge Date: 02/09/2022       Discharge Disposition: Home    Discharge Services: None    Discharge DME: None    Discharge Transportation: family or friend will provide    Private pay costs discussed: Not applicable    PAS Confirmation Code:    Patient/family educated on Medicare website which has current facility and service quality ratings: other (see comments) (Not needed)    Education Provided on the Discharge Plan:    Persons Notified of Discharge Plans: Patient  Patient/Family in Agreement with the Plan: yes    Handoff Referral Completed: No    Additional Information:  Discharged to home. No needs identified.         Farida Saleh RN

## 2022-02-09 NOTE — PLAN OF CARE
Problem: Adult Inpatient Plan of Care  Goal: Plan of Care Review  Outcome: Improving     Problem: Pain Acute  Goal: Acceptable Pain Control and Functional Ability  Outcome: Improving     Problem: Nausea and Vomiting  Goal: Fluid and Electrolyte Balance  Outcome: Improving     Patient denies pain. No nausea or emesis.  IV fluids infusing.  Protonix infusing.   Zosyn given.  Gastrograffin SBFT today.

## 2022-02-09 NOTE — PROGRESS NOTES
Care Management Follow Up    Length of Stay (days): 1    Expected Discharge Date: 02/10/2022     Concerns to be Addressed:     Surgery following-NPO at this time, small bowel follow through pending, IV Protonix, IV antibiotics.   Patient plan of care discussed at interdisciplinary rounds: Yes    Anticipated Discharge Disposition:  Home likely      Anticipated Discharge Services:  None anticipated  Anticipated Discharge DME:  None anticipated    Patient/family educated on Medicare website which has current facility and service quality ratings:  Not needed at this time  Education Provided on the Discharge Plan:  Per team  Patient/Family in Agreement with the Plan:  Yes    Referrals Placed by CM/SW:  None at this time  Private pay costs discussed: Not applicable    Additional Information:  Chart Reviewed. From home with parents; independent at baseline. No needs anticipated. Family to transport. Care manager to follow as needed.   11:20 AM per note from surgery could potentially discharge today 2/9 pending diet tolerance.     Farida Saleh RN

## 2022-02-09 NOTE — PROGRESS NOTES
"General Surgery Progress Note:    Hospital Day # 1    ASSESSMENT:   1. Perforation of intestine (H)    2. Idiopathic acute pancreatitis without infection or necrosis        Maria R Mcdaniel is a 46 year old female with questionable marginal ulcer or perforation. UGI today showed no signs of emely    PLAN:   -Clears and ADAT  -Could discharge as early as today from surgical standpoint, if tolerates diet  -Recommend PPI at discharge    Radu Monge PA-C  Pager - [phone]  Phone - [phone]   General Surgery    SUBJECTIVE:   Maria R Mcdaniel is feeling good, no pain, passing gas and BM, wants to eat and go home if able    Patient Vitals for the past 24 hrs:   BP Temp Temp src Pulse Resp SpO2 Height Weight   02/09/22 0801 116/75 97.9  F (36.6  C) Oral 64 18 98 % -- --   02/09/22 0404 107/59 97.7  F (36.5  C) Oral 56 18 97 % -- --   02/08/22 2328 110/66 98.2  F (36.8  C) Oral 63 18 97 % -- --   02/08/22 2153 116/66 98.4  F (36.9  C) Oral 62 16 98 % -- --   02/08/22 1700 119/63 97.7  F (36.5  C) Oral 63 18 100 % 1.638 m (5' 4.5\") 90 kg (198 lb 8 oz)   02/08/22 1615 -- -- -- 61 -- 97 % -- --   02/08/22 1400 113/68 -- -- 61 -- 95 % -- --   02/08/22 1300 118/78 -- -- 64 -- 96 % -- --   02/08/22 1200 115/68 -- -- 60 -- 97 % -- --   02/08/22 1100 124/78 -- -- 62 -- 95 % -- --       Physical Exam:  General: NAD, pleasant  CV:RRR  LUNGS:CTA bilaterally  ABD: soft, nondistended, nontender  EXT:no CCE     Lab Results   Component Value Date    WBC 7.2 02/09/2022    HGB 11.3 02/09/2022    HCT 36.1 02/09/2022    MCV 84 02/09/2022     02/09/2022     INR/Prothrombin Time  Recent Labs   Lab 02/09/22  0740      CO2 23   BUN 8     Lab Results   Component Value Date    ALT 17 02/08/2022    AST 20 02/08/2022    ALKPHOS 46 02/08/2022         "

## 2022-02-09 NOTE — DISCHARGE SUMMARY
[facility] MEDICINE  DISCHARGE SUMMARY     Primary Care Physician: Physician No Ref-Primary  Admission Date: 2/8/2022   Discharge Provider: Therese Cotto Discharge Date: 2/9/2022   Diet:   Active Diet and Nourishment Order   Procedures     Advance Diet as Tolerated: Clear Liquid Diet; Regular Diet Adult     Diet       Code Status: Full Code   Activity: DCACTIVITY: Activity as tolerated        Condition at Discharge: Good     REASON FOR PRESENTATION(See Admission Note for Details)   abd pain    PRINCIPAL & ACTIVE DISCHARGE DIAGNOSES     Active Problems:    Perforation of intestine (H)    Idiopathic acute pancreatitis without infection or necrosis      PENDING LABS     Unresulted Labs Ordered in the Past 30 Days of this Admission     No orders found for last 31 day(s).          PROCEDURES ( this hospitalization only)      none    RECOMMENDATIONS TO OUTPATIENT PROVIDER FOR F/U VISIT     Follow-up Appointments     Follow-up and recommended labs and tests      Follow up with primary care provider, Physician No Ref-Primary, within 7   days for hospital follow- up.  No follow up labs or test are needed.    Consider followup endoscopy in 6 weeks or so especially if you still have   any pain.             DISPOSITION     Home    SUMMARY OF HOSPITAL COURSE:      Maria R Mcdaniel is a 46 year old female admitted on 2/8/2022. She has history of hypertension, obesity, RnY gastric bypass surgery in 2018 presented for evaluation of epigastric pain found to have marginal ulcer. Hospital Day: 2     #Suspected perforated marginal ulcer  Patient has been taking ibuprofen at home.  Presented for 1 week of epigastric pain, CT scan showed soft tissue stranding along the anterior gastric pouch/Rody limb with prominent region of air along the anastomosis site concerning for possible localized perforation. Patient was started on PPI drip and kept NPO. Pain rapidly subsided. The next day General Surgery  advised gastrografin upper GI study which showed no clear perforation. Patient without any signs or symptoms of sepsis. If there was ever a perforation, it seems to have sealed. Ok to advance to liquid diet per surgeon. Patient tolerated this well here. There does not seem to be any indication for ongoing antibiotics. Patient was on IV Zosyn here, will stop antibiotics. Offered to observe patient overnight off antibiotics but she was comfortable discharging home and will be vigilant for symptoms and return if needed. Follow up with primary doctor 1 week. Surgeon did not feel that she necessarily needs any followup if she does well but if there are any linger symptoms, then she could have EGD in 6-8 weeks. She can follow up with surgeon as needed. Patient was advised to stop taking ibuprofen and should take PPI for 90 days.    #Hypertension  Patient BP running on low side here maybe due to NPO for 2 days or so. Advised her not to restart meds at this time but should follow up soon for BP recheck.    Discharge Medications with Med changes:     Current Discharge Medication List      START taking these medications    Details   pantoprazole (PROTONIX) 20 MG EC tablet Take 2 tablets (40 mg) by mouth daily before breakfast  Qty: 180 tablet, Refills: 0    Associated Diagnoses: Marginal ulcer         CONTINUE these medications which have NOT CHANGED    Details   !! DULoxetine (CYMBALTA) 30 MG capsule Take 30 mg by mouth daily Total dose is 90 mg      !! DULoxetine (CYMBALTA) 60 MG capsule Take 60 mg by mouth daily Total dose is 90 mg      levothyroxine (SYNTHROID/LEVOTHROID) 50 MCG tablet Take 50 mcg by mouth daily       MULTIPLE VITAMIN PO Take 1 tablet by mouth daily       !! - Potential duplicate medications found. Please discuss with provider.      STOP taking these medications       amLODIPine (NORVASC) 10 MG tablet Comments:   Reason for Stopping:         ibuprofen (ADVIL/MOTRIN) 800 MG tablet Comments:   Reason for  Stopping:         losartan (COZAAR) 100 MG tablet Comments:   Reason for Stopping:                 Consults     SURGERY GENERAL IP CONSULT  SOCIAL WORK IP CONSULT    SIGNIFICANT IMAGING FINDINGS     Results for orders placed or performed during the hospital encounter of 02/08/22   Chest XR,  PA & LAT    Impression    IMPRESSION: Normal heart size and pulmonary vascularity. Lungs clear. No pneumothorax or pleural fluid. Degenerative changes both shoulders and within the spine. Postsurgical changes upper abdomen.   XR Gastrografin Upper GI and SBFT    Impression    IMPRESSION: Post Rody-en-Y gastric bypass. Transient tiny pocket of contrast near the gastrojejunal anastomosis adjacent to the blind end of the Rody limb and inferior lateral margin of the gastric pouch. A small marginal ulceration is a differential   consideration. This is not convincing for a contained perforation. No evidence of an anastomotic stricture.       SIGNIFICANT LABORATORY FINDINGS     See emr    Discharge Orders        Reason for your hospital stay    ulcer     Follow-up and recommended labs and tests    Follow up with primary care provider, Physician No Ref-Primary, within 7 days for hospital follow- up.  No follow up labs or test are needed.    Consider followup endoscopy in 6 weeks or so especially if you still have any pain.     Activity    Your activity upon discharge: activity as tolerated     When to contact your care team    Call your primary doctor if you have any of the following: chest pain, shortness of breath, fever, chills, fainting, dizziness, vomiting, worsening pain, or bleeding, or any other symptoms that are new or concerning to you.     Diet    Follow this diet upon discharge: Liquid diet for 3 days and then if you are feeling well, can resume your normal diet       Examination   Physical Exam   Temp:  [97.7  F (36.5  C)-98.4  F (36.9  C)] 97.9  F (36.6  C)  Pulse:  [56-66] 66  Resp:  [16-18] 18  BP: (107-119)/(59-75)  113/70  SpO2:  [97 %-100 %] 99 %  Wt Readings from Last 1 Encounters:   02/08/22 90 kg (198 lb 8 oz)       General: in no apparent distress, non-toxic and alert female lying in hospital bed oriented x3  HEENT: Head normocephalic atraumatic, oral mucosa moist. Sclerae anicteric  Skin: No rashes or lesions  Extremities: No peripheral edema  Psych: Normal affect, mood euthymic  Neuro: CNII-XII grossly intact, moving all 4 extremities    Please see EMR for more detailed significant labs, imaging, consultant notes etc.    I, Therese Cotto MD, personally saw the patient today and spent greater than 30 minutes discharging this patient.    Therese Cotto MD  [facility]    CC:No Ref-Primary, Physician

## 2022-02-10 ENCOUNTER — PATIENT OUTREACH (OUTPATIENT)
Dept: CARE COORDINATION | Facility: CLINIC | Age: 47
End: 2022-02-10
Payer: COMMERCIAL

## 2022-02-10 DIAGNOSIS — Z71.89 OTHER SPECIFIED COUNSELING: ICD-10-CM

## 2022-02-10 NOTE — PROGRESS NOTES
Clinic Care Coordination Contact    Background: Care Coordination referral placed from [facility] [place] discharge report for reason of patient meeting criteria for a TCM outreach call by [facility] team.    Assessment: Upon chart review, [facility] Team member will cancel/close the referral for TCM outreach due to reason below:    Patient declined completing post hospital discharge questions    Plan: Care Coordination referral for TCM outreach canceled.    Domonique Farooq MA  [facility] Resource [facility]

## 2023-02-17 LAB
ANION GAP SERPL CALCULATED.3IONS-SCNC: 12 MMOL/L (ref 7–15)
BASOPHILS # BLD AUTO: 0 10E3/UL (ref 0–0.2)
BASOPHILS NFR BLD AUTO: 1 %
BUN SERPL-MCNC: 15.7 MG/DL (ref 6–20)
CALCIUM SERPL-MCNC: 9.2 MG/DL (ref 8.6–10)
CHLORIDE SERPL-SCNC: 106 MMOL/L (ref 98–107)
CREAT SERPL-MCNC: 1.01 MG/DL (ref 0.51–0.95)
D DIMER PPP FEU-MCNC: 1.15 UG/ML FEU (ref 0–0.5)
DEPRECATED HCO3 PLAS-SCNC: 22 MMOL/L (ref 22–29)
EOSINOPHIL # BLD AUTO: 0.1 10E3/UL (ref 0–0.7)
EOSINOPHIL NFR BLD AUTO: 2 %
ERYTHROCYTE [DISTWIDTH] IN BLOOD BY AUTOMATED COUNT: 15.2 % (ref 10–15)
FLUAV RNA SPEC QL NAA+PROBE: NEGATIVE
FLUBV RNA RESP QL NAA+PROBE: NEGATIVE
GFR SERPL CREATININE-BSD FRML MDRD: 68 ML/MIN/1.73M2
GLUCOSE SERPL-MCNC: 91 MG/DL (ref 70–99)
HCT VFR BLD AUTO: 37.2 % (ref 35–47)
HGB BLD-MCNC: 11.7 G/DL (ref 11.7–15.7)
IMM GRANULOCYTES # BLD: 0 10E3/UL
IMM GRANULOCYTES NFR BLD: 1 %
LACTATE SERPL-SCNC: 0.6 MMOL/L (ref 0.7–2)
LYMPHOCYTES # BLD AUTO: 1.3 10E3/UL (ref 0.8–5.3)
LYMPHOCYTES NFR BLD AUTO: 28 %
MCH RBC QN AUTO: 25.9 PG (ref 26.5–33)
MCHC RBC AUTO-ENTMCNC: 31.5 G/DL (ref 31.5–36.5)
MCV RBC AUTO: 82 FL (ref 78–100)
MONOCYTES # BLD AUTO: 0.4 10E3/UL (ref 0–1.3)
MONOCYTES NFR BLD AUTO: 10 %
NEUTROPHILS # BLD AUTO: 2.6 10E3/UL (ref 1.6–8.3)
NEUTROPHILS NFR BLD AUTO: 58 %
NRBC # BLD AUTO: 0 10E3/UL
NRBC BLD AUTO-RTO: 0 /100
PLATELET # BLD AUTO: 223 10E3/UL (ref 150–450)
POTASSIUM SERPL-SCNC: 3.6 MMOL/L (ref 3.4–5.3)
RBC # BLD AUTO: 4.52 10E6/UL (ref 3.8–5.2)
RSV RNA SPEC NAA+PROBE: NEGATIVE
SARS-COV-2 RNA RESP QL NAA+PROBE: NEGATIVE
SODIUM SERPL-SCNC: 140 MMOL/L (ref 136–145)
TROPONIN T SERPL HS-MCNC: 12 NG/L
WBC # BLD AUTO: 4.4 10E3/UL (ref 4–11)

## 2023-02-17 PROCEDURE — 93005 ELECTROCARDIOGRAM TRACING: CPT | Performed by: STUDENT IN AN ORGANIZED HEALTH CARE EDUCATION/TRAINING PROGRAM

## 2023-02-17 PROCEDURE — 80048 BASIC METABOLIC PNL TOTAL CA: CPT | Performed by: EMERGENCY MEDICINE

## 2023-02-17 PROCEDURE — 85025 COMPLETE CBC W/AUTO DIFF WBC: CPT | Performed by: STUDENT IN AN ORGANIZED HEALTH CARE EDUCATION/TRAINING PROGRAM

## 2023-02-17 PROCEDURE — 36415 COLL VENOUS BLD VENIPUNCTURE: CPT | Performed by: EMERGENCY MEDICINE

## 2023-02-17 PROCEDURE — 84484 ASSAY OF TROPONIN QUANT: CPT | Performed by: STUDENT IN AN ORGANIZED HEALTH CARE EDUCATION/TRAINING PROGRAM

## 2023-02-17 PROCEDURE — 258N000003 HC RX IP 258 OP 636: Performed by: EMERGENCY MEDICINE

## 2023-02-17 PROCEDURE — 96360 HYDRATION IV INFUSION INIT: CPT | Mod: 59

## 2023-02-17 PROCEDURE — 85025 COMPLETE CBC W/AUTO DIFF WBC: CPT | Performed by: EMERGENCY MEDICINE

## 2023-02-17 PROCEDURE — 80048 BASIC METABOLIC PNL TOTAL CA: CPT | Performed by: STUDENT IN AN ORGANIZED HEALTH CARE EDUCATION/TRAINING PROGRAM

## 2023-02-17 PROCEDURE — 93005 ELECTROCARDIOGRAM TRACING: CPT | Performed by: EMERGENCY MEDICINE

## 2023-02-17 PROCEDURE — 99285 EMERGENCY DEPT VISIT HI MDM: CPT | Mod: CS,25

## 2023-02-17 PROCEDURE — C9803 HOPD COVID-19 SPEC COLLECT: HCPCS

## 2023-02-17 PROCEDURE — 83605 ASSAY OF LACTIC ACID: CPT | Performed by: EMERGENCY MEDICINE

## 2023-02-17 PROCEDURE — 87637 SARSCOV2&INF A&B&RSV AMP PRB: CPT | Performed by: EMERGENCY MEDICINE

## 2023-02-17 PROCEDURE — 84484 ASSAY OF TROPONIN QUANT: CPT | Performed by: EMERGENCY MEDICINE

## 2023-02-17 PROCEDURE — 85379 FIBRIN DEGRADATION QUANT: CPT | Performed by: EMERGENCY MEDICINE

## 2023-02-17 RX ADMIN — SODIUM CHLORIDE 1000 ML: 9 INJECTION, SOLUTION INTRAVENOUS at 23:00

## 2023-02-18 ENCOUNTER — HOSPITAL ENCOUNTER (EMERGENCY)
Facility: HOSPITAL | Age: 48
Discharge: HOME | End: 2023-02-18
Attending: EMERGENCY MEDICINE | Admitting: EMERGENCY MEDICINE
Payer: COMMERCIAL

## 2023-02-18 ENCOUNTER — APPOINTMENT (OUTPATIENT)
Dept: CT IMAGING | Facility: HOSPITAL | Age: 48
End: 2023-02-18
Attending: EMERGENCY MEDICINE
Payer: COMMERCIAL

## 2023-02-18 VITALS
WEIGHT: 210 LBS | OXYGEN SATURATION: 97 % | BODY MASS INDEX: 35.85 KG/M2 | DIASTOLIC BLOOD PRESSURE: 69 MMHG | RESPIRATION RATE: 16 BRPM | SYSTOLIC BLOOD PRESSURE: 119 MMHG | HEIGHT: 64 IN | HEART RATE: 62 BPM | TEMPERATURE: 98.2 F

## 2023-02-18 DIAGNOSIS — R55 NEAR SYNCOPE: ICD-10-CM

## 2023-02-18 DIAGNOSIS — R11.2 NAUSEA AND VOMITING, UNSPECIFIED VOMITING TYPE: ICD-10-CM

## 2023-02-18 DIAGNOSIS — E86.0 DEHYDRATION: ICD-10-CM

## 2023-02-18 LAB
ALBUMIN UR-MCNC: NEGATIVE MG/DL
APPEARANCE UR: CLEAR
BACTERIA #/AREA URNS HPF: ABNORMAL /HPF
BILIRUB UR QL STRIP: NEGATIVE
COLOR UR AUTO: ABNORMAL
GLUCOSE UR STRIP-MCNC: NEGATIVE MG/DL
HGB UR QL STRIP: NEGATIVE
HYALINE CASTS: 22 /LPF
KETONES UR STRIP-MCNC: NEGATIVE MG/DL
LEUKOCYTE ESTERASE UR QL STRIP: NEGATIVE
MUCOUS THREADS #/AREA URNS LPF: PRESENT /LPF
NITRATE UR QL: NEGATIVE
PH UR STRIP: 6 [PH] (ref 5–7)
RBC URINE: <1 /HPF
SP GR UR STRIP: 1.01 (ref 1–1.03)
SQUAMOUS EPITHELIAL: 2 /HPF
TROPONIN T SERPL HS-MCNC: 9 NG/L
UROBILINOGEN UR STRIP-MCNC: <2 MG/DL
WBC URINE: 8 /HPF

## 2023-02-18 PROCEDURE — 84484 ASSAY OF TROPONIN QUANT: CPT | Performed by: EMERGENCY MEDICINE

## 2023-02-18 PROCEDURE — 36415 COLL VENOUS BLD VENIPUNCTURE: CPT | Performed by: EMERGENCY MEDICINE

## 2023-02-18 PROCEDURE — 71275 CT ANGIOGRAPHY CHEST: CPT

## 2023-02-18 PROCEDURE — 250N000011 HC RX IP 250 OP 636: Performed by: EMERGENCY MEDICINE

## 2023-02-18 PROCEDURE — 81001 URINALYSIS AUTO W/SCOPE: CPT | Performed by: EMERGENCY MEDICINE

## 2023-02-18 RX ORDER — ATORVASTATIN CALCIUM 40 MG/1
1 TABLET, FILM COATED ORAL DAILY
COMMUNITY
Start: 2022-10-12

## 2023-02-18 RX ORDER — ONDANSETRON 4 MG/1
4 TABLET, ORALLY DISINTEGRATING ORAL EVERY 6 HOURS PRN
Qty: 10 TABLET | Refills: 0 | Status: SHIPPED | OUTPATIENT
Start: 2023-02-18 | End: 2023-02-21

## 2023-02-18 RX ORDER — IOPAMIDOL 755 MG/ML
100 INJECTION, SOLUTION INTRAVASCULAR ONCE
Status: COMPLETED | OUTPATIENT
Start: 2023-02-18 | End: 2023-02-18

## 2023-02-18 RX ORDER — LOSARTAN POTASSIUM 100 MG/1
1 TABLET ORAL DAILY
COMMUNITY
Start: 2022-12-14

## 2023-02-18 RX ADMIN — IOPAMIDOL 100 ML: 755 INJECTION, SOLUTION INTRAVENOUS at 01:45

## 2023-02-18 ASSESSMENT — ACTIVITIES OF DAILY LIVING (ADL)
ADLS_ACUITY_SCORE: 33
ADLS_ACUITY_SCORE: 35

## 2023-02-18 ASSESSMENT — ENCOUNTER SYMPTOMS
ABDOMINAL PAIN: 0
DIZZINESS: 1
CHILLS: 1
LIGHT-HEADEDNESS: 1
HEADACHES: 1
NAUSEA: 1
DYSURIA: 0
SHORTNESS OF BREATH: 0

## 2023-02-18 NOTE — ED TRIAGE NOTES
Pt states sick for 3 days. HA and lightheadedness. Denies pain. States was at work and had syncopal episode. States nausea but no vomiting. Denies uti s/s. States 2 BMs yesterday. Having chills.  Hx of htn.     Triage Assessment     Row Name 02/17/23 6033       Triage Assessment (Adult)    Airway WDL WDL

## 2023-02-18 NOTE — ED PROVIDER NOTES
Emergency Department Encounter      NAME: Maria R Mcdaniel  AGE: 47 year old female  YOB: 1975  MRN: [id number]  EVALUATION DATE & TIME: No admission date for patient encounter.    PCP: Sathish Smith    ED PROVIDER: Lamont Du M.D.      Chief Complaint   Patient presents with     Dizziness         FINAL IMPRESSION:  1. Nausea and vomiting, unspecified vomiting type    2. Dehydration    3. Near syncope        MEDICAL DECISION MAKIN:46 AM I met with the patient, obtained history, performed an initial exam, and discussed options and plan for diagnostics and treatment here in the ED.     This patient is a 47-year-old female who presents with lightheadedness.  She says that yesterday she began to feel lightheaded and dizzy.  She complained of nausea.  She had a headache as well.  She has not been able to eat anything solid but has been able to tolerate p.o. fluids.  Today she was feeling fine around 9 PM when she became diaphoretic and had a near syncopal episode.  She described having blurry vision and was told by some coworkers that she might of passed out.  She has been feeling feverish, chills and been sweaty.  She did take a home COVID test which was negative.  In the exam she had a unremarkable exam.  An EKG was done which showed LVH with QRS widening and repolarization.  A troponin was done which was slightly elevated at 12.  It was repeated and the repeat troponin hours later was 9.  Her white count was normal at 4.4, she had a normal urine, she had a normal lactic acid.  Her D-dimer that was elevated at 1.15 so a chest CT pulmonary angiogram was done.  This did not show any evidence of pneumonia or pneumothorax.  There is no pulmonary embolism.  The patient was discharged home to follow-up with her doctor if the symptoms continue.  I reviewed the patient's medical records.  I independently reviewed and interpreted the EKG.  I independently reviewed and interpreted the patient's  chest CT angiogram.      Pertinent Labs & Imaging studies reviewed. (See chart for details)    Medical Decision Making    History:    Supplemental history from: Documented in chart, if applicable    External Record(s) reviewed: Documented in chart, if applicable.    Work Up:    Chart documentation includes differential considered and any EKGs or imaging independently interpreted by provider, where specified.    In additional to work up documented, I considered the following work up: Documented in chart, if applicable.    External consultation:    Discussion of management with another provider: Documented in chart, if applicable    Complicating factors:    Care impacted by chronic illness: N/A    Care affected by social determinants of health: N/A    Disposition considerations: Discharge. No recommendations on prescription strength medication(s). See documentation for any additional details.      The importance of close follow up was discussed. We reviewed warning signs and symptoms, and I instructed Ms. Mcdaniel to return to the emergency department immediately if she develops any new or worsening symptoms. I provided additional verbal discharge instructions. Ms. Mcdaniel expressed understanding and agreement with this plan of care, her questions were answered, and she was discharged in stable condition.       MEDICATIONS GIVEN IN THE EMERGENCY:  Medications   0.9% sodium chloride BOLUS (0 mLs Intravenous Stopped 2/18/23 0000)   iopamidol (ISOVUE-370) solution 100 mL (100 mLs Intravenous Given 2/18/23 0145)       NEW PRESCRIPTIONS STARTED AT TODAY'S ER VISIT:  Discharge Medication List as of 2/18/2023  3:36 AM      START taking these medications    Details   ondansetron (ZOFRAN ODT) 4 MG ODT tab Take 1 tablet (4 mg) by mouth every 6 hours as needed for nausea, Disp-10 tablet, R-0, Local Print                =================================================================    HPI    Patient information was obtained  from: Patient    Use of : N/A         Maria R Mcdaniel is a 47 year old female with a past pertinent medical history, who presents dizziness.    Patient reports she started feeling dizzy, light headed, nauseas, and had a headache yesterday. She the started to dry heave. She hasn't eaten since just drinking fluids. Today she was feeling fine then everything came back around 3PM. She felt clammy and felt like passing out. She also had blurred vision. She was told she passed out by cough workers but doesn't believe she did. She also has been feeling hot and cold and sweaty. Her home Covid test yesterday was negative. She denies chest pain, abdominal pain, shortness of breath, urinary issues, or any other complaints.       REVIEW OF SYSTEMS   Review of Systems   Constitutional: Positive for chills.   Eyes: Positive for visual disturbance.   Respiratory: Negative for shortness of breath.    Cardiovascular: Negative for chest pain.   Gastrointestinal: Positive for nausea. Negative for abdominal pain.   Genitourinary: Negative for dysuria.   Neurological: Positive for dizziness, light-headedness and headaches.   All other systems reviewed and are negative.       PAST MEDICAL HISTORY:  History reviewed. No pertinent past medical history.    PAST SURGICAL HISTORY:  Past Surgical History:   Procedure Laterality Date     ZZC GASTRIC BYPASS,OBESE<100CM RODY-EN-Y      Description: Gastric Surgery For Morbid Obesity Bypass With Rody-en-Y;  Recorded: 09/16/2008;       CURRENT MEDICATIONS:    No current facility-administered medications for this encounter.    Current Outpatient Medications:      atorvastatin (LIPITOR) 40 MG tablet, Take 1 tablet by mouth daily, Disp: , Rfl:      losartan (COZAAR) 100 MG tablet, Take 1 tablet by mouth daily, Disp: , Rfl:      ondansetron (ZOFRAN ODT) 4 MG ODT tab, Take 1 tablet (4 mg) by mouth every 6 hours as needed for nausea, Disp: 10 tablet, Rfl: 0    ALLERGIES:  No Known  "Allergies    FAMILY HISTORY:  No family history on file.    SOCIAL HISTORY:   Social History     Socioeconomic History     Marital status: Single   Tobacco Use     Smoking status: Every Day     Types: Cigarettes     Smokeless tobacco: Never   Substance and Sexual Activity     Alcohol use: Yes     Comment: Alcoholic Drinks/day: occasionally        PHYSICAL EXAM:    Vitals: /69   Pulse 62   Temp 98.2  F (36.8  C) (Oral)   Resp 16   Ht 1.626 m (5' 4\")   Wt 95.3 kg (210 lb)   SpO2 97%   BMI 36.05 kg/m     Constitutional: Well developed, well nourished.Comfortable appearing.  HEAD:Normocephalic, atraumatic, Thinning hair on scalp with no tenderness.  Eyes: PERRLA, EOM intact, conjunctiva clear, no discharge  ENT: mucous membranes moist, nose normal.   Neck- Supple, gross ROM intact.  No JVD.  No palpable nodes.  Pulmonary: Clear to auscultation bilaterally, no respiratory distress, no wheezing, speaks full sentences easily.  Chest: No chest wall tenderness  Cardiovascular: Normal heart rate, regular rhythm, no murmurs. No lower extremity edema, 2+ DP pulses.   GI: Soft, no tenderness to deep palpation in all quadrants, not distended, no masses.  No hepatosplenomegaly.  Musculoskeletal: Moving all 4 extremities intentionally and without pain. No obvious deformity.  Back: No CVA tenderness  Skin: Warm, dry, no rash.  Neurologic: Alert & oriented x 3, speech clear, moving all extremities spontaneously   Psychiatric: Affect normal, cooperative.     LAB:  All pertinent labs reviewed and interpreted.  Labs Ordered and Resulted from Time of ED Arrival to Time of ED Departure   BASIC METABOLIC PANEL - Abnormal       Result Value    Sodium 140      Potassium 3.6      Chloride 106      Carbon Dioxide (CO2) 22      Anion Gap 12      Urea Nitrogen 15.7      Creatinine 1.01 (*)     Calcium 9.2      Glucose 91      GFR Estimate 68     D DIMER QUANTITATIVE - Abnormal    D-Dimer Quantitative 1.15 (*)    LACTIC ACID WHOLE " BLOOD - Abnormal    Lactic Acid 0.6 (*)    ROUTINE UA WITH MICROSCOPIC REFLEX TO CULTURE - Abnormal    Color Urine Light Yellow      Appearance Urine Clear      Glucose Urine Negative      Bilirubin Urine Negative      Ketones Urine Negative      Specific Gravity Urine 1.014      Blood Urine Negative      pH Urine 6.0      Protein Albumin Urine Negative      Urobilinogen Urine <2.0      Nitrite Urine Negative      Leukocyte Esterase Urine Negative      Bacteria Urine Few (*)     Mucus Urine Present (*)     RBC Urine <1      WBC Urine 8 (*)     Squamous Epithelials Urine 2 (*)     Hyaline Casts Urine 22 (*)    CBC WITH PLATELETS AND DIFFERENTIAL - Abnormal    WBC Count 4.4      RBC Count 4.52      Hemoglobin 11.7      Hematocrit 37.2      MCV 82      MCH 25.9 (*)     MCHC 31.5      RDW 15.2 (*)     Platelet Count 223      % Neutrophils 58      % Lymphocytes 28      % Monocytes 10      % Eosinophils 2      % Basophils 1      % Immature Granulocytes 1      NRBCs per 100 WBC 0      Absolute Neutrophils 2.6      Absolute Lymphocytes 1.3      Absolute Monocytes 0.4      Absolute Eosinophils 0.1      Absolute Basophils 0.0      Absolute Immature Granulocytes 0.0      Absolute NRBCs 0.0     TROPONIN T, HIGH SENSITIVITY - Normal    Troponin T, High Sensitivity 12     INFLUENZA A/B & SARS-COV2 PCR MULTIPLEX - Normal    Influenza A PCR Negative      Influenza B PCR Negative      RSV PCR Negative      SARS CoV2 PCR Negative     TROPONIN T, HIGH SENSITIVITY - Normal    Troponin T, High Sensitivity 9         RADIOLOGY:  CT Chest Pulmonary Embolism w Contrast   Final Result   IMPRESSION:    1.  No visualized pulmonary embolus.   2.  No evidence of active pulmonary disease.           EKG:   Performed at: 2:17  Impression: Sinus rhythm Left ventricular hypertrophy with QRS widening and repolarization abnormality Possible Lateral infarct , age undetermined. Abnormal ECG.  Rate: 69  Rhythm: Sinus  QRS Interval: 128  QTc Interval:  264  Comparison: N/A  I have independently reviewed and interpreted the EKG(s) documented above.     PROCEDURES:   Procedures       I, Edi Walter, am serving as a scribe to document services personally performed by Dr. Lamont Du based on my observation and the provider's statements to me. I, Lamont Du M.D. attest that Edi Walter is acting in a scribe capacity, has observed my performance of the services and has documented them in accordance with my direction.      Lamont Du M.D.  Emergency Medicine  [facility] EMERGENCY DEPARTMENT  [facility] [facility] [address] [phone]  Dept: [phone]     Lamont Du MD  03/09/23 1531

## 2023-02-18 NOTE — Clinical Note
Maria R Mcdaniel was seen and treated in our emergency department on 2/17/2023.  She may return to work on 02/21/2023.       If you have any questions or concerns, please don't hesitate to call.      Lamont Du MD

## 2023-03-08 ENCOUNTER — OFFICE VISIT (OUTPATIENT)
Dept: FAMILY MEDICINE | Facility: CLINIC | Age: 48
End: 2023-03-08
Payer: COMMERCIAL

## 2023-03-08 VITALS
SYSTOLIC BLOOD PRESSURE: 118 MMHG | OXYGEN SATURATION: 99 % | HEIGHT: 64 IN | HEART RATE: 78 BPM | WEIGHT: 207 LBS | BODY MASS INDEX: 35.34 KG/M2 | DIASTOLIC BLOOD PRESSURE: 76 MMHG

## 2023-03-08 DIAGNOSIS — R01.1 UNDIAGNOSED CARDIAC MURMURS: ICD-10-CM

## 2023-03-08 DIAGNOSIS — R11.2 NAUSEA AND VOMITING, UNSPECIFIED VOMITING TYPE: Primary | ICD-10-CM

## 2023-03-08 DIAGNOSIS — R73.03 PREDIABETES: ICD-10-CM

## 2023-03-08 LAB
ALBUMIN SERPL BCG-MCNC: 4.2 G/DL (ref 3.5–5.2)
ALP SERPL-CCNC: 56 U/L (ref 35–104)
ALT SERPL W P-5'-P-CCNC: 14 U/L (ref 10–35)
ANION GAP SERPL CALCULATED.3IONS-SCNC: 12 MMOL/L (ref 7–15)
AST SERPL W P-5'-P-CCNC: 25 U/L (ref 10–35)
BILIRUB SERPL-MCNC: 0.9 MG/DL
BUN SERPL-MCNC: 9.5 MG/DL (ref 6–20)
CALCIUM SERPL-MCNC: 9 MG/DL (ref 8.6–10)
CHLORIDE SERPL-SCNC: 108 MMOL/L (ref 98–107)
CREAT SERPL-MCNC: 0.69 MG/DL (ref 0.51–0.95)
DEPRECATED HCO3 PLAS-SCNC: 24 MMOL/L (ref 22–29)
ERYTHROCYTE [DISTWIDTH] IN BLOOD BY AUTOMATED COUNT: 14.4 % (ref 10–15)
GFR SERPL CREATININE-BSD FRML MDRD: >90 ML/MIN/1.73M2
GLUCOSE SERPL-MCNC: 91 MG/DL (ref 70–99)
HCT VFR BLD AUTO: 38.7 % (ref 35–47)
HGB BLD-MCNC: 12.7 G/DL (ref 11.7–15.7)
MCH RBC QN AUTO: 26.6 PG (ref 26.5–33)
MCHC RBC AUTO-ENTMCNC: 32.8 G/DL (ref 31.5–36.5)
MCV RBC AUTO: 81 FL (ref 78–100)
PLATELET # BLD AUTO: 276 10E3/UL (ref 150–450)
POTASSIUM SERPL-SCNC: 4 MMOL/L (ref 3.4–5.3)
PROT SERPL-MCNC: 6.7 G/DL (ref 6.4–8.3)
RBC # BLD AUTO: 4.77 10E6/UL (ref 3.8–5.2)
SODIUM SERPL-SCNC: 144 MMOL/L (ref 136–145)
WBC # BLD AUTO: 6.1 10E3/UL (ref 4–11)

## 2023-03-08 PROCEDURE — 85027 COMPLETE CBC AUTOMATED: CPT

## 2023-03-08 PROCEDURE — 80053 COMPREHEN METABOLIC PANEL: CPT

## 2023-03-08 PROCEDURE — 99204 OFFICE O/P NEW MOD 45 MIN: CPT

## 2023-03-08 PROCEDURE — 36415 COLL VENOUS BLD VENIPUNCTURE: CPT

## 2023-03-08 ASSESSMENT — ENCOUNTER SYMPTOMS
HEMATOCHEZIA: 0
VOMITING: 0
ABDOMINAL PAIN: 0
NAUSEA: 0
DIARRHEA: 0
CONSTIPATION: 0
SHORTNESS OF BREATH: 0

## 2023-03-08 ASSESSMENT — PAIN SCALES - GENERAL: PAINLEVEL: NO PAIN (0)

## 2023-03-08 NOTE — PROGRESS NOTES
"  Assessment & Plan     Nausea and vomiting, unspecified vomiting type  Resolved. Will recheck if creatinine has improved on CMP. Suspect elevation was from dehydration due to the nausea and vomiting. No other intervention needed at this time. Patient can return to clinic or reach out if symptoms develop again.   - Comprehensive metabolic panel (BMP + Alb, Alk Phos, ALT, AST, Total. Bili, TP); Future    Undiagnosed cardiac murmurs  Found during exam today. 2/6. No thrill. No chest pain, palpitations, SOB, or SCHRADER. No Orthopnea. No peripheral edema. Will order echo for work-up of asymptomatic cardiac murmur. Will check CBC for any anemia.   - Echocardiogram Complete; Future  - CBC with platelets    Prediabetes  Most recent A1C is 6.0. Glucose of 91 in the ER. Patient has no s/s of hyperglycemia. Discussed increasing exercising and healthy diet changes. We can recheck A1C after three months of lifestyle changes and make recommendations further based on this.        MED REC REQUIRED  Post Medication Reconciliation Status: discharge medications reconciled, continue medications without change  BMI:   Estimated body mass index is 35.53 kg/m  as calculated from the following:    Height as of this encounter: 1.626 m (5' 4\").    Weight as of this encounter: 93.9 kg (207 lb).   Weight management plan: Discussed healthy diet and exercise guidelines    Return in about 3 months (around 6/8/2023).    Darin Heck is a 48 year old, presenting for the following health issues:  Hospital F/U (Pt seen at [facility] 2/18/23 admitted for vomiting and nauseas. Negative Covid-19 test at hospital and did CT of chest and blood work as well. Pt states blood work came back pre-diabetic /Pt states feeling better after 4-5 days being out. /No new concerns. /)    Patient presents to the ER for nausea vomiting.  Reports she almost passed out.  No cause to the nausea or vomiting was found, she was COVID-negative.  She was given IV " "fluids and discharged.  Everything has resolved and she is feeling better.  She is concerned because blood work came back and she is prediabetic.  She is having no nausea, no vomiting, no diarrhea, no fevers, and no other issues of concern.  She is not having any excessive thirst, hunger, or urination    HPI     Hospital Follow-up Visit:    Hospital/Nursing Home/IP Rehab Facility: [facility]  Date of Admission: 2/18/23  Date of Discharge: 2/18/23  Reason(s) for Admission: Nauseas and vomiting     Was your hospitalization related to COVID-19? No   Problems taking medications regularly:  None  Medication changes since discharge: None  Problems adhering to non-medication therapy:  None    Summary of hospitalization:  [facility] discharge summary reviewed  Diagnostic Tests/Treatments reviewed.  Follow up needed: none  Other Healthcare Providers Involved in Patient s Care:         None  Update since discharge: improved.       Plan of care communicated with patient     Review of Systems   Constitutional: Negative for fever.   Respiratory: Negative for shortness of breath.    Cardiovascular: Negative for chest pain, palpitations and peripheral edema.   Gastrointestinal: Negative for abdominal pain, constipation, diarrhea, hematochezia, nausea and vomiting.   Endocrine: Negative for polydipsia, polyphagia and polyuria.          Objective    /76 (BP Location: Right arm, Patient Position: Sitting, Cuff Size: Adult Regular)   Pulse 78   Ht 1.626 m (5' 4\")   Wt 93.9 kg (207 lb)   SpO2 99%   BMI 35.53 kg/m    Body mass index is 35.53 kg/m .  Physical Exam  Constitutional:       General: She is not in acute distress.  Eyes:      Extraocular Movements: Extraocular movements intact.   Cardiovascular:      Rate and Rhythm: Normal rate and regular rhythm.      Heart sounds: Murmur heard.   Pulmonary:      Effort: Pulmonary effort is normal. No respiratory distress.      Breath " sounds: Normal breath sounds.   Abdominal:      General: Abdomen is flat. Bowel sounds are normal. There is no distension.      Tenderness: There is no abdominal tenderness.   Musculoskeletal:      Right lower leg: No edema.      Left lower leg: No edema.   Neurological:      General: No focal deficit present.      Mental Status: She is alert.   Psychiatric:         Mood and Affect: Mood normal.         Thought Content: Thought content normal.       At the end of the visit, I confirmed understanding of what was discussed. Patient has no further questions or concerns that were brought up at this time.     Minerva Giles, MAGDALENO, APRN, FNP-C

## 2023-03-08 NOTE — PATIENT INSTRUCTIONS
You will need to see a provider in 3 months to recheck your hemoglobin A1C (diabetes number). You can schedule at our clinic by calling [phone].

## 2023-03-08 NOTE — LETTER
March 15, 2023      Maria R Diggssergio  [address] [facility] [address]        Dear ,    We are writing to inform you of your test results.    Maria R,      Your blood work all looked good. Liver function, kidneys function tests, and electrolytes were all normal except for your chloride level which was only slightly elevated. No concerns. Your blood counts all looked good to.      Please reach out if you have any questions or concerns,      Minerva Giles DNP, APRN, FNP-C  [phone]    Resulted Orders   CBC with platelets   Result Value Ref Range    WBC Count 6.1 4.0 - 11.0 10e3/uL    RBC Count 4.77 3.80 - 5.20 10e6/uL    Hemoglobin 12.7 11.7 - 15.7 g/dL    Hematocrit 38.7 35.0 - 47.0 %    MCV 81 78 - 100 fL    MCH 26.6 26.5 - 33.0 pg    MCHC 32.8 31.5 - 36.5 g/dL    RDW 14.4 10.0 - 15.0 %    Platelet Count 276 150 - 450 10e3/uL       If you have any questions or concerns, please call the clinic at the number listed above.       Sincerely,      Minerva Giles, ABRIL CNP

## 2023-03-14 ASSESSMENT — ENCOUNTER SYMPTOMS
PALPITATIONS: 0
POLYPHAGIA: 0
FEVER: 0
POLYDIPSIA: 0

## 2023-03-29 ENCOUNTER — OFFICE VISIT (OUTPATIENT)
Dept: FAMILY MEDICINE | Facility: CLINIC | Age: 48
End: 2023-03-29
Payer: COMMERCIAL

## 2023-03-29 VITALS
HEIGHT: 65 IN | WEIGHT: 204.7 LBS | DIASTOLIC BLOOD PRESSURE: 72 MMHG | HEART RATE: 84 BPM | BODY MASS INDEX: 34.1 KG/M2 | SYSTOLIC BLOOD PRESSURE: 106 MMHG | RESPIRATION RATE: 18 BRPM

## 2023-03-29 DIAGNOSIS — Z12.31 VISIT FOR SCREENING MAMMOGRAM: ICD-10-CM

## 2023-03-29 DIAGNOSIS — N91.2 AMENORRHEA: ICD-10-CM

## 2023-03-29 DIAGNOSIS — L65.8 FEMALE PATTERN BALDNESS: Primary | ICD-10-CM

## 2023-03-29 DIAGNOSIS — Z12.4 CERVICAL CANCER SCREENING: ICD-10-CM

## 2023-03-29 LAB
ALBUMIN SERPL BCG-MCNC: 4.4 G/DL (ref 3.5–5.2)
ALP SERPL-CCNC: 63 U/L (ref 35–104)
ALT SERPL W P-5'-P-CCNC: 9 U/L (ref 10–35)
ANION GAP SERPL CALCULATED.3IONS-SCNC: 11 MMOL/L (ref 7–15)
AST SERPL W P-5'-P-CCNC: 18 U/L (ref 10–35)
BILIRUB SERPL-MCNC: 1 MG/DL
BUN SERPL-MCNC: 13.9 MG/DL (ref 6–20)
CALCIUM SERPL-MCNC: 9.7 MG/DL (ref 8.6–10)
CHLORIDE SERPL-SCNC: 105 MMOL/L (ref 98–107)
CREAT SERPL-MCNC: 0.69 MG/DL (ref 0.51–0.95)
DEPRECATED CALCIDIOL+CALCIFEROL SERPL-MC: 77 UG/L (ref 20–75)
DEPRECATED HCO3 PLAS-SCNC: 24 MMOL/L (ref 22–29)
ESTRADIOL SERPL-MCNC: <5 PG/ML
FSH SERPL IRP2-ACNC: 30.4 MIU/ML
GFR SERPL CREATININE-BSD FRML MDRD: >90 ML/MIN/1.73M2
GLUCOSE SERPL-MCNC: 94 MG/DL (ref 70–99)
HOLD SPECIMEN: NORMAL
LH SERPL-ACNC: 10.7 MIU/ML
POTASSIUM SERPL-SCNC: 4.3 MMOL/L (ref 3.4–5.3)
PROLACTIN SERPL 3RD IS-MCNC: 29 NG/ML (ref 5–23)
PROT SERPL-MCNC: 7.3 G/DL (ref 6.4–8.3)
SODIUM SERPL-SCNC: 140 MMOL/L (ref 136–145)

## 2023-03-29 PROCEDURE — 99213 OFFICE O/P EST LOW 20 MIN: CPT

## 2023-03-29 PROCEDURE — 84146 ASSAY OF PROLACTIN: CPT

## 2023-03-29 PROCEDURE — 83001 ASSAY OF GONADOTROPIN (FSH): CPT

## 2023-03-29 PROCEDURE — 82306 VITAMIN D 25 HYDROXY: CPT

## 2023-03-29 PROCEDURE — 36415 COLL VENOUS BLD VENIPUNCTURE: CPT

## 2023-03-29 PROCEDURE — 82670 ASSAY OF TOTAL ESTRADIOL: CPT

## 2023-03-29 PROCEDURE — 83002 ASSAY OF GONADOTROPIN (LH): CPT

## 2023-03-29 PROCEDURE — 80053 COMPREHEN METABOLIC PANEL: CPT

## 2023-03-29 ASSESSMENT — PAIN SCALES - GENERAL: PAINLEVEL: MODERATE PAIN (5)

## 2023-03-29 NOTE — PROGRESS NOTES
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 She has seen dermatology virtually, but not in person. She has not tried any of the therapies previously recommended by the dermatologist. We will check vitamin D and CMP today. TSH in 12/2022 was normal. ANIVAL 9 months ago was negative, RF negative. No other reports today on ROS that are concerning for autoimmune condition at this time. Recommend starting biotin supplement and trying topical minoxidil. Notified patient that this takes several months to see results and has to be continued lifelong. We could consider spironolactone therapy and see if she has benefit, but will await labs prior to starting this.  She does have some scattered erythemic/scabbing lesions to her scalp so would encourage her to see dermatology in person for their insight.   - Vitamin D Deficiency; Future  - Prolactin; Future  - Extra Tube; Future  - Adult Dermatology Referral; Future  - Comprehensive metabolic panel (BMP + Alb, Alk Phos, ALT, AST, Total. Bili, TP); Future    Amenorrhea  Patient reports absence of menarche since in her 20s. Unsure if in menopause but would like to know. Will check TSH, estradiol and LH today. Patient does not think she has every had a pelvic US, she gets her PAP smears through [facility]. No history of abnormal pap. I can not view these records at this time.   - Follicle stimulating hormone; Future  - Estradiol; Future  - Luteinizing Hormone    Darin Heck is a 48 year old, presenting for the following health issues:  Hair Loss (Has been worsening over the years---scalp is sore as well)    Additional Questions 3/29/2023   Roomed by Charis MARTINEZ     Patient presents for evaluation of hair thinning. She has tried shampoos, roagine, and ovter the counter medications to try and help but she has had no relief of symptoms. She has tried to cut down on washing her  "hair. She notes she also has an overly sensative scalp at this time and it \"does not feel normal.\"     She tells me she has not had a period since she was 26 or 27. She follows with [facility]. She is unsure why this is and she is unsure if she is in menopause. She has hot flashes during the day and night sweats.     Concern - Hair Loss  Onset: years ago  Description: hair loss worsening over the years--scalp feels tight  Intensity: moderate  Progression of Symptoms:  worsening  Accompanying Signs & Symptoms: none  Previous history of similar problem: none  Precipitating factors:        Worsened by: none  Alleviating factors:        Improved by: none  Therapies tried and outcome: shampoo, rogane    Review of Systems   Constitutional: Negative for fatigue.   HENT:        Hair thinning and scalp sensitivity.    Musculoskeletal: Negative for arthralgias.          Objective    /72 (BP Location: Right arm, Patient Position: Sitting, Cuff Size: Adult Large)   Pulse 84   Resp 18   Ht 1.638 m (5' 4.5\")   Wt 92.9 kg (204 lb 11.2 oz)   BMI 34.59 kg/m    Body mass index is 34.59 kg/m .  Physical Exam  HENT:      Head:      Comments: Thinning of hair to the crown of patient's head. There are pinpoint erythemic lesions scattered. Appears it could be scabbing from picking or other etiology.   Pulmonary:      Effort: No respiratory distress.   Neurological:      Mental Status: She is alert.        At the end of the visit, I confirmed understanding of what was discussed. Patient has no further questions or concerns that were brought up at this time.     Minerva Giles, MAGDALENO, APRN, FNP-C        "

## 2023-03-30 ENCOUNTER — OFFICE VISIT (OUTPATIENT)
Dept: RHEUMATOLOGY | Facility: CLINIC | Age: 48
End: 2023-03-30
Payer: COMMERCIAL

## 2023-03-30 VITALS
HEART RATE: 68 BPM | DIASTOLIC BLOOD PRESSURE: 84 MMHG | WEIGHT: 205 LBS | BODY MASS INDEX: 34.64 KG/M2 | SYSTOLIC BLOOD PRESSURE: 124 MMHG

## 2023-03-30 DIAGNOSIS — M15.0 PRIMARY OSTEOARTHRITIS INVOLVING MULTIPLE JOINTS: ICD-10-CM

## 2023-03-30 DIAGNOSIS — M70.61 GREATER TROCHANTERIC BURSITIS OF RIGHT HIP: Primary | ICD-10-CM

## 2023-03-30 PROCEDURE — 99214 OFFICE O/P EST MOD 30 MIN: CPT | Mod: 25 | Performed by: INTERNAL MEDICINE

## 2023-03-30 PROCEDURE — 20610 DRAIN/INJ JOINT/BURSA W/O US: CPT | Mod: RT | Performed by: INTERNAL MEDICINE

## 2023-03-30 RX ORDER — TRIAMCINOLONE ACETONIDE 40 MG/ML
40 INJECTION, SUSPENSION INTRA-ARTICULAR; INTRAMUSCULAR ONCE
Status: COMPLETED | OUTPATIENT
Start: 2023-03-30 | End: 2023-03-30

## 2023-03-30 RX ADMIN — TRIAMCINOLONE ACETONIDE 40 MG: 40 INJECTION, SUSPENSION INTRA-ARTICULAR; INTRAMUSCULAR at 10:43

## 2023-03-30 NOTE — PROGRESS NOTES
Rheumatology follow-up visit note     Maria R is a 48 year old female presents today for follow-up.    Maria R was seen today for joint pain.    Diagnoses and all orders for this visit:    Greater trochanteric bursitis of right hip  -     triamcinolone (KENALOG-40) injection 40 mg  -     ASPIRATION/INJECTION MAJOR JOINT    Primary osteoarthritis involving multiple joints            This patient is here for exacerbation of pain in the right trochanteric area for nearly a year, she has trochanteric bursitis.  We discussed options.  She would like to pursue local injection of steroids.  After pros and cons were discussed including risk of infection, bleeding, skin changes including thinning, pigmentary alteration and scarring to name a few, right hip, trochanteric area,  injected as noted in the orders section. This was done with nontouch technique.  The patient tolerated the procedure well and had a brisk Marcaine effect.  The postinjection care was discussed.        Follow-up as needed.    HPI    Maria R Mcdaniel is a 48 year old female is here for follow-up of pain this is in her right hip region, this is gone on for several weeks, she was last evaluated for this in January 2022.  In the interim she has continued to do well.  More recently she has had more pain in bilateral trochanter region worse on the right side keeps her up at night when she rolls over on that side there is no radiation down the leg there is no history of trauma she has not had a rash.  In the past corticosteroid injections were helpful. Recently she had trochanteric area injection which provided her good relief.  She has noted discomfort in other joint regions including the knees, the neck and lower back.  She is well known to have spondylosis.      DETAILED EXAMINATION  03/30/23  :    Vitals:    03/30/23 0728   BP: 124/84   BP Location: Right arm   Patient Position: Sitting   Cuff Size: Adult Large   Pulse: 68   Weight: 93 kg (205  lb)     Alert oriented. Head including the face is examined for malar rash, heliotropes, scarring, lupus pernio. Eyes examined for redness such as in episcleritis/scleritis, periorbital lesions.   Neck/ Face examined for parotid gland swelling, range of motion of neck.  Left upper and lower and right upper and lower extremities examined for tenderness, swelling, warmth of the appendicular joints, range of motion, edema, rash.  Some of the important findings included: she does not have evidence of synovitis in the palpable joints of the upper extremities.  No significant deformities of the digits.  Marked tenderness of the right trochanteric area.  There is no rash in the region.  She has normal gait.  Patient Active Problem List    Diagnosis Date Noted     Perforation of intestine (H) 02/07/2022     Priority: Medium     Idiopathic acute pancreatitis without infection or necrosis 02/07/2022     Priority: Medium     Polyarthralgia 12/17/2018     Priority: Medium     Primary osteoarthritis involving multiple joints 12/17/2018     Priority: Medium     Essential hypertension 12/17/2018     Priority: Medium     Hyperprolactinemia      Priority: Medium     Created by Conversion         Obesity      Priority: Medium     Created by Conversion         Secondary Amenorrhea      Priority: Medium     Created by Conversion         Past Surgical History:   Procedure Laterality Date     ZZC GASTRIC BYPASS,OBESE<100CM RODY-EN-Y      Description: Gastric Surgery For Morbid Obesity Bypass With Rody-en-Y;  Recorded: 09/16/2008;      No past medical history on file.  No Known Allergies  Current Outpatient Medications   Medication Sig Dispense Refill     atorvastatin (LIPITOR) 40 MG tablet Take 1 tablet by mouth daily       losartan (COZAAR) 100 MG tablet Take 1 tablet by mouth daily       family history is not on file.  Social Connections: Not on file          WBC Count   Date Value Ref Range Status   03/08/2023 6.1 4.0 - 11.0 10e3/uL  Final     RBC Count   Date Value Ref Range Status   03/08/2023 4.77 3.80 - 5.20 10e6/uL Final     Hemoglobin   Date Value Ref Range Status   03/08/2023 12.7 11.7 - 15.7 g/dL Final     Hematocrit   Date Value Ref Range Status   03/08/2023 38.7 35.0 - 47.0 % Final     MCV   Date Value Ref Range Status   03/08/2023 81 78 - 100 fL Final     MCH   Date Value Ref Range Status   03/08/2023 26.6 26.5 - 33.0 pg Final     Platelet Count   Date Value Ref Range Status   03/08/2023 276 150 - 450 10e3/uL Final     % Lymphocytes   Date Value Ref Range Status   02/17/2023 28 % Final     AST   Date Value Ref Range Status   03/29/2023 18 10 - 35 U/L Final     ALT   Date Value Ref Range Status   03/29/2023 9 (L) 10 - 35 U/L Final     Albumin   Date Value Ref Range Status   03/29/2023 4.4 3.5 - 5.2 g/dL Final   02/08/2022 3.3 (L) 3.5 - 5.0 g/dL Final     Alkaline Phosphatase   Date Value Ref Range Status   03/29/2023 63 35 - 104 U/L Final     Creatinine   Date Value Ref Range Status   03/29/2023 0.69 0.51 - 0.95 mg/dL Final     GFR Estimate   Date Value Ref Range Status   03/29/2023 >90 >60 mL/min/1.73m2 Final     Comment:     eGFR calculated using 2021 CKD-EPI equation.

## 2023-04-04 ENCOUNTER — TELEPHONE (OUTPATIENT)
Dept: FAMILY MEDICINE | Facility: CLINIC | Age: 48
End: 2023-04-04
Payer: COMMERCIAL

## 2023-04-04 ASSESSMENT — ENCOUNTER SYMPTOMS
FATIGUE: 0
ARTHRALGIAS: 0

## 2023-04-04 NOTE — TELEPHONE ENCOUNTER
Test Results    Contacts       Type Contact Phone/Fax    04/04/2023 10:50 AM CDT Phone (Incoming) Maria R Mcdaniel (Self) [phone] (M)          Who ordered the test:  Ludin SAMUELS NP    Type of test: Lab    Date of test:  03/29/2023    Where was the test performed:  [facility]    What are your questions/concerns?:  Seeking lab results note/comment from Ludin Giles    Could we send this information to you in [employer] or would you prefer to receive a phone call?:   Patient would like to be contacted via [employer]

## 2023-04-05 DIAGNOSIS — E22.1 HYPERPROLACTINEMIA (H): Primary | ICD-10-CM

## 2023-04-11 ENCOUNTER — HOSPITAL ENCOUNTER (OUTPATIENT)
Dept: CARDIOLOGY | Facility: HOSPITAL | Age: 48
Discharge: HOME | End: 2023-04-11
Payer: COMMERCIAL

## 2023-04-11 DIAGNOSIS — R01.1 UNDIAGNOSED CARDIAC MURMURS: ICD-10-CM

## 2023-04-11 LAB — LVEF ECHO: NORMAL

## 2023-04-11 PROCEDURE — 93306 TTE W/DOPPLER COMPLETE: CPT | Mod: 26 | Performed by: INTERNAL MEDICINE

## 2023-04-11 PROCEDURE — 93306 TTE W/DOPPLER COMPLETE: CPT

## 2023-04-19 ENCOUNTER — TELEPHONE (OUTPATIENT)
Dept: FAMILY MEDICINE | Facility: CLINIC | Age: 48
End: 2023-04-19
Payer: COMMERCIAL

## 2023-04-19 DIAGNOSIS — I35.8 MILD AORTIC VALVE SCLEROSIS: Primary | ICD-10-CM

## 2023-04-19 DIAGNOSIS — I35.8 MILD AORTIC VALVE SCLEROSIS: ICD-10-CM

## 2023-04-19 NOTE — TELEPHONE ENCOUNTER
Patient had echo completed for undiagnosed murmur:     Interpretation Summary     Left ventricular size, wall motion and function are normal. The ejection  fraction is 60-65%.  Normal right ventricle size and systolic function.  The left atrium is mildly dilated.  There is mild trileaflet aortic sclerosis.    Patient asymptomatic when I saw her. Will plan on repeat echo is 3-5 years if continues to be asymptomatic. Patient is on a statin and BP is at goal. No evidence of DM on most recent CMP.

## 2023-05-13 ENCOUNTER — HEALTH MAINTENANCE LETTER (OUTPATIENT)
Age: 48
End: 2023-05-13

## 2023-06-16 ENCOUNTER — HOSPITAL ENCOUNTER (OUTPATIENT)
Dept: ULTRASOUND IMAGING | Facility: HOSPITAL | Age: 48
Discharge: HOME | End: 2023-06-16
Attending: NURSE PRACTITIONER | Admitting: NURSE PRACTITIONER
Payer: COMMERCIAL

## 2023-06-16 ENCOUNTER — OFFICE VISIT (OUTPATIENT)
Dept: FAMILY MEDICINE | Facility: CLINIC | Age: 48
End: 2023-06-16
Payer: COMMERCIAL

## 2023-06-16 VITALS
SYSTOLIC BLOOD PRESSURE: 133 MMHG | RESPIRATION RATE: 16 BRPM | DIASTOLIC BLOOD PRESSURE: 87 MMHG | TEMPERATURE: 97.9 F | BODY MASS INDEX: 33.8 KG/M2 | HEART RATE: 58 BPM | WEIGHT: 200 LBS | OXYGEN SATURATION: 100 %

## 2023-06-16 DIAGNOSIS — R22.1 LUMP ON NECK: Primary | ICD-10-CM

## 2023-06-16 DIAGNOSIS — R22.1 LUMP ON NECK: ICD-10-CM

## 2023-06-16 PROCEDURE — 76536 US EXAM OF HEAD AND NECK: CPT

## 2023-06-16 PROCEDURE — 99214 OFFICE O/P EST MOD 30 MIN: CPT | Performed by: NURSE PRACTITIONER

## 2023-06-16 NOTE — PROGRESS NOTES
"Assessment & Plan     Lump on neck    - US Thyroid       Patient with firm, cystic, large mobile mass located on or overlying the thyroid area of the neck.  No erythema. She noticed it 3 days ago.  No other illness symptoms such as fever or chills.  Is not related to eating.  Has not had difficulty swallowing or feeling as if there is an obstruction.    Started with an ultrasound which showed what appears to be an epidermoid a sebaceous cyst.  No issues with the thyroid itself.    Discussed with patient nonurgent nature.  Due to it being in a cosmetically significant area, patient told can have it removed nonurgently by a surgeon, and that sometimes these do improve on their own without intervention.  Explained can get infected and she should return at any time if this becomes inflamed or very tender.  Patient can obtain a surgery consult through primary care provider if bothersome.              Return for If no better.    Galilea Tanner, [facility]    Darin Heck is a 48 year old female who presents to clinic today for the following health issues:  Chief Complaint   Patient presents with     Neck Problem     X monday. Firm lump under neck. Some pain upon touch or pressure.     HPI    In area of thyroid the anterior neck, noticed a lump.  Noticed about 3-5 days ago.      No problems swallowing.      No fevers.     Took \"thyroid medicine\" years ago.  Not sure what.      No illness sx.      Not worse with eating.           Review of Systems  See HPI.      Objective    /87   Pulse 58   Temp 97.9  F (36.6  C) (Oral)   Resp 16   Wt 90.7 kg (200 lb)   SpO2 100%   BMI 33.80 kg/m    Physical Exam  Constitutional:       General: She is not in acute distress.     Appearance: She is well-developed.   HENT:      Head:      Comments: 6 cm x 3.5 cm mobile, firm lump located mid anterior neck.   Eyes:      General:         Right eye: No discharge.         Left eye: No " discharge.      Conjunctiva/sclera: Conjunctivae normal.   Pulmonary:      Effort: Pulmonary effort is normal.   Musculoskeletal:         General: Normal range of motion.   Lymphadenopathy:      Cervical: No cervical adenopathy.   Skin:     General: Skin is warm and dry.      Capillary Refill: Capillary refill takes less than 2 seconds.      Findings: No bruising or erythema.   Neurological:      Mental Status: She is alert and oriented to person, place, and time.   Psychiatric:         Mood and Affect: Mood normal.         Behavior: Behavior normal.         Thought Content: Thought content normal.         Judgment: Judgment normal.          Results for orders placed or performed during the hospital encounter of 06/16/23   US Thyroid     Status: None    Narrative    EXAM: US THYROID  LOCATION: [facility]  DATE: 6/16/2023    INDICATION: Large, mobile lump near on thyroid x 5 days.  COMPARISON: None.  TECHNIQUE: Thyroid ultrasound.     FINDINGS:  RIGHT lobe: 4 x 1.4 x 1.2 cm. It contains an upper pole 8 mm calcified nodule not detailed below.  Isthmus: 3 mm. Right-sided 5 mm marginated hypoechoic nodule, not detailed below.  LEFT lobe: 4.5 x 1.4 x 1.3 cm. Mid medial 1.5 cm densely calcified nodule, not detailed below. Lower pole 0.8 cm hypoechoic nodule detailed below.    NECK: No cervical lymphadenopathy.    NODULES:    Nodule 1: Left lower pole 0.8 x 0.7 x 0.7 cm   Composition: Solid or almost completely solid, 2 points   Echogenicity: Hyperechoic or isoechoic, 1 point   Shape: Wider-than-tall, 0 points   Margin: Smooth, 0 points   Echogenic Foci: None, or large comet-tail artifacts, 0 points   Point Total: 3 points. TI-RADS 3. If 2.5 cm or larger, recommend FNA; if 1.5 cm or larger, recommend follow up US at 1, 3, and 5 years.      Imaging performed over the left submandibular lump. There is some subcutaneous soft tissue thickening in a nonmarginated area of heterogeneous tissue. This is  estimated at 7 mm and appears to have a dermal tail. This suggests a sebaceous cyst.      Impression    IMPRESSION:  1.  Left thyroid 0.8 cm TI-RADS 3 nodule. No FNA or follow-up recommended.  2.  Left submandibular subcutaneous 7 mm nodule with a dermal tail. This is nonspecific but could represent a sebaceous cyst.      Nodules are characterized per  ACR Thyroid Imaging, Reporting and Data System (TI-RADS): White Paper of the ACR TI-RADS Committee  Kimo Suarez. et al. Journal of the American College of Radiology 2017. Volume 14 (2017), Issue 5, 587-595.

## 2023-06-16 NOTE — PATIENT INSTRUCTIONS
The ultrasound suggests a epidermoid cyst which is not a cancer.  See handout related to this.    Sometimes these will go away or get smaller on their own.  These happen for unknown reason.    Sometimes these can get infected.  If you see any redness or the starts to hurt, you should come back.  Otherwise, if it is bothersome or very large, they can be removed by a surgeon.  Feel free to send a message to your primary care provider if you would like to discuss this with a surgeon for removal.          
Cold/Sinus (Pediatric)

## 2023-06-28 ENCOUNTER — OFFICE VISIT (OUTPATIENT)
Dept: INTERNAL MEDICINE | Facility: CLINIC | Age: 48
End: 2023-06-28
Payer: COMMERCIAL

## 2023-06-28 ENCOUNTER — HOSPITAL ENCOUNTER (OUTPATIENT)
Dept: GENERAL RADIOLOGY | Facility: HOSPITAL | Age: 48
Discharge: HOME | End: 2023-06-28
Attending: NURSE PRACTITIONER | Admitting: NURSE PRACTITIONER
Payer: COMMERCIAL

## 2023-06-28 VITALS
HEART RATE: 65 BPM | WEIGHT: 196.9 LBS | BODY MASS INDEX: 32.8 KG/M2 | DIASTOLIC BLOOD PRESSURE: 74 MMHG | RESPIRATION RATE: 16 BRPM | SYSTOLIC BLOOD PRESSURE: 126 MMHG | TEMPERATURE: 98.4 F | HEIGHT: 65 IN | OXYGEN SATURATION: 98 %

## 2023-06-28 DIAGNOSIS — K59.00 CONSTIPATION, UNSPECIFIED CONSTIPATION TYPE: ICD-10-CM

## 2023-06-28 DIAGNOSIS — M54.50 ACUTE MIDLINE LOW BACK PAIN WITHOUT SCIATICA: ICD-10-CM

## 2023-06-28 DIAGNOSIS — M54.50 ACUTE MIDLINE LOW BACK PAIN WITHOUT SCIATICA: Primary | ICD-10-CM

## 2023-06-28 PROCEDURE — 72100 X-RAY EXAM L-S SPINE 2/3 VWS: CPT

## 2023-06-28 PROCEDURE — 99213 OFFICE O/P EST LOW 20 MIN: CPT | Performed by: NURSE PRACTITIONER

## 2023-06-28 RX ORDER — METHYLPREDNISOLONE 4 MG
TABLET, DOSE PACK ORAL
Qty: 21 TABLET | Refills: 0 | Status: SHIPPED | OUTPATIENT
Start: 2023-06-28 | End: 2023-09-06

## 2023-06-28 RX ORDER — METHOCARBAMOL 500 MG/1
500 TABLET, FILM COATED ORAL 4 TIMES DAILY PRN
Qty: 30 TABLET | Refills: 0 | Status: SHIPPED | OUTPATIENT
Start: 2023-06-28 | End: 2023-09-06

## 2023-06-28 ASSESSMENT — PAIN SCALES - GENERAL: PAINLEVEL: EXTREME PAIN (8)

## 2023-06-28 ASSESSMENT — ENCOUNTER SYMPTOMS: BACK PAIN: 1

## 2023-06-28 NOTE — PROGRESS NOTES
Assessment & Plan   Problem List Items Addressed This Visit    None  Visit Diagnoses     Acute midline low back pain without sciatica    -  Primary    Relevant Medications    methylPREDNISolone (MEDROL DOSEPAK) 4 MG tablet therapy pack    methocarbamol (ROBAXIN) 500 MG tablet    Other Relevant Orders    XR Lumbar Spine 2/3 Views (Completed)    Physical Therapy Referral    Spine  Referral    Constipation, unspecified constipation type             Recommend high-fiber diet adequate water intake MiraLAX 1-2 times daily         Eloisa Sena NP  [facility]    Darin Heck is a 48 year old, presenting for the following health issues:  Back Pain (Patient stated that her lower bilateral back pain started a month ago. Its a sharp shooting pain that makes it hard to sleep on her back. )        6/28/2023    12:00 PM   Additional Questions   Roomed by Nimo LEZAMA MA   Accompanied by DANIELA     Back Pain     History of Present Illness       Back Pain:  She presents for follow up of back pain. Patient's back pain is a new problem.    Original cause of back pain: not sure  First noticed back pain: more than 1 month ago  Patient feels back pain: constantlyLocation of back pain:  Other  Description of back pain: sharp, shooting and stabbing  Back pain spreads: right buttocks and left buttocks    Since patient first noticed back pain, pain is: gradually worsening  Does back pain interfere with her job:  Yes  On a scale of 1-10 (10 being the worst), patient describes pain as:  8  What makes back pain worse: lying down and sitting  Acupuncture: not tried  Acetaminophen: not helpful  Activity or exercise: not tried  Chiropractor:  Not tried  Cold: not helpful  Heat: not helpful  Massage: not tried  Muscle relaxants: not tried  NSAIDS: not helpful  Opioids: not tried  Physical Therapy: not tried  Rest: not helpful  Steroid Injection: not tried  Stretching: not helpful  Surgery: not tried  TENS  "unit: not tried  Topical pain relievers: not tried  Other healthcare providers patient is seeing for back pain: None    She eats 2-3 servings of fruits and vegetables daily.She consumes 1 sweetened beverage(s) daily.She exercises with enough effort to increase her heart rate 9 or less minutes per day.  She exercises with enough effort to increase her heart rate 3 or less days per week.   She is taking medications regularly.               Review of Systems   Musculoskeletal: Positive for back pain.            Objective    /74 (BP Location: Right arm, Patient Position: Sitting, Cuff Size: Adult Regular)   Pulse 65   Temp 98.4  F (36.9  C) (Oral)   Resp 16   Ht 1.638 m (5' 4.5\")   Wt 89.3 kg (196 lb 14.4 oz)   SpO2 98%   BMI 33.28 kg/m    Body mass index is 33.28 kg/m .  Physical Exam   GENERAL: healthy, alert and no distress  EYES: Eyes grossly normal to inspection,  conjunctivae and sclerae normal  RESP: Respirations regular nonlabored  ABDOMEN: soft, nontender, no hepatosplenomegaly, no masses and bowel sounds normal  MS: no gross musculoskeletal defects noted, no edema  BACK: no CVA tenderness,  paralumbar tenderness  PSYCH: mentation appears normal, affect normal/bright    Results for orders placed or performed during the hospital encounter of 06/28/23   XR Lumbar Spine 2/3 Views     Status: None    Narrative    EXAM: XR LUMBAR SPINE 2/3 VIEWS  LOCATION: [facility]  DATE: 6/28/2023    INDICATION:  Acute midline low back pain without sciatica  COMPARISON: None.      Impression    IMPRESSION: There are 5 lumbar type vertebral bodies. There is mild dextro convex curvature of the lumbar spine. Mild loss of disc height at L4-L5 and mild to moderate loss of disc height at L5-S1. Mild to moderate facet arthropathy at the lumbosacral   junction. No acute fracture.     No results found for this or any previous visit (from the past 24 hour(s)).                "

## 2023-07-12 ENCOUNTER — THERAPY VISIT (OUTPATIENT)
Dept: PHYSICAL THERAPY | Facility: REHABILITATION | Age: 48
End: 2023-07-12
Attending: NURSE PRACTITIONER
Payer: COMMERCIAL

## 2023-07-12 DIAGNOSIS — M25.69 DECREASED RANGE OF MOTION OF TRUNK AND BACK: Primary | ICD-10-CM

## 2023-07-12 DIAGNOSIS — M62.81 MUSCLE WEAKNESS (GENERALIZED): ICD-10-CM

## 2023-07-12 DIAGNOSIS — M54.50 ACUTE MIDLINE LOW BACK PAIN WITHOUT SCIATICA: ICD-10-CM

## 2023-07-12 PROCEDURE — 97110 THERAPEUTIC EXERCISES: CPT | Mod: GP | Performed by: PHYSICAL THERAPIST

## 2023-07-12 PROCEDURE — 97161 PT EVAL LOW COMPLEX 20 MIN: CPT | Mod: GP | Performed by: PHYSICAL THERAPIST

## 2023-07-12 PROCEDURE — 97112 NEUROMUSCULAR REEDUCATION: CPT | Mod: GP | Performed by: PHYSICAL THERAPIST

## 2023-07-12 NOTE — PROGRESS NOTES
PHYSICAL THERAPY EVALUATION  Type of Visit: Evaluation    See electronic medical record for Abuse and Falls Screening details.    Subjective      Presenting condition or subjective complaint: Back pain   Pt reports back pain has been bothering for about 3 months and getting worse. Pt went to [facility] and had xrays done - was given a steroid pack and is almost done with that - not sure how much it has helped with pain sensitivity yet. Pt also is using advil - prior to bed and using hot - better than cold. Pt can't think of anything that seemed to be related to when pain started to bother in April. When pain first started it would come and go but now it feels like it is happening more consistently.   Pt reports taking her dogs - 3 labs - for walks daily - usually going around a mile.  Walking feels good. Sitting for extended periods of time - especially towards end of day - and then when she tries to get up from sitting she will have pain. Pain can also bother at night.   Pt reports no difficulties with bowel/bladder control and does not have her cycle anymore.   Pt is on her feet most of the day and feels usually fine at work.   Date of onset: 04/12/23    Relevant medical history:   PMHx of intermittent neck pain that has been helped with chiropractic care  Dates & types of surgery:  no spinal or abdominal surgeries    Prior diagnostic imaging/testing results: X-ray   demo's mild to moderate facet arthropathy L5-S1  Prior therapy history for the same diagnosis, illness or injury: No      Prior Level of Function   Transfers: Independent  Ambulation: Independent  ADL: Independent      Living Environment  Social support: With family members   Type of home: House   Stairs to enter the home: Yes 17 Is there a railing: Yes   Ramp: Yes   Stairs inside the home: Yes 17 Is there a railing: Yes   Help at home: None  Equipment owned:       Employment: Yes Manager  Hobbies/Interests: No hobbies    Patient goals for therapy: Sleep and  sit for a period of time with no pain    Pain assessment: 0-10/10 - feels better without pain when active and moving around with work - gets more sore with static positions and night time - pt wakes up because of pain     Objective   LUMBAR SPINE EVALUATION    POSTURE: Mild forward head, rounded shoulders, increased thoracic kyphosis     GAIT:   Weightbearing Status: WBAT  Assistive Device(s): None  Gait Deviations: B trendelenberg R > L    BALANCE/PROPRIOCEPTION: APTA sit to stand 9 reps - <13 reps at risk for falls    ROM: Trunk flex WFL with fingertips just above toes with good stretch felt in low back but pain on the way back up, ext min limited with tightness and increased pain on R side - no change with repeated extension to sx, R SB mod limited with fingertips to distal thigh and increased R > L sided back pain, L SB min limited with fingertips to knee jt line with increased B L > R back pain     PELVIC/SI SCREEN: B hip ROM WFL but painful with R hip ER and abd and L hip flex and ER     STRENGTH: R hip flexor 4/5, L hip flexor 4+/5, R quad 4+/5, L quad 5/5, R DF 4+/5, L DF 5/5   Trunk/core weakness noted with difficulty with transitional movements during ROM testing, lateral hip weakness noted with gait    DERMATOMES: WNL B LE     NEURAL TENSION: Negative slump testing B LE     FLEXIBILITY: TIghtness B lateral hip and QL R > L  LUMBAR/HIP Special Tests: Negative scour B   PELVIS/SI SPECIAL TESTS: Positive POSH testing B LE for back pain reproduction    SPINAL SEGMENTAL CONCLUSIONS: Hypomobility lumbosacral junction    Assessment & Plan   CLINICAL IMPRESSIONS   Medical Diagnosis: Acute midline low back pain without sciatica    Treatment Diagnosis: Acute B low back pain with decreased trunk ROM and muscle weakness   Impression/Assessment: Patient is a 48 year old female with acute low back pain complaints.  Pt reports insidious onset about 3 months ago with intermittent sx but more recently feels like sx are  increasing. Pt feels better with movement and more sensitive with static positions - especially toward the end of the day and sx can bother her at night. Pt demo's signs and sx consistent with mildly arthritic changes in low back with trunk/core/LE weakness contributing to mechanical low back pain with decreased trunk ROM - especially ext and B SB with decreased gait quality and below age/gender norms for APTA sit to stand. The following significant findings have been identified: Pain, Decreased ROM/flexibility, Decreased joint mobility, Decreased strength, Impaired balance, Impaired gait, Impaired muscle performance, Decreased activity tolerance and Impaired posture. These impairments interfere with their ability to perform self care tasks, recreational activities, household chores, household mobility and community mobility as compared to previous level of function.  Pt is good candidate for skilled PT services as outlined to address impairments and reach goals.     Clinical Decision Making (Complexity):   Clinical Presentation: Stable/Uncomplicated  Clinical Presentation Rationale: based on medical and personal factors listed in PT evaluation  Clinical Decision Making (Complexity): Low complexity    PLAN OF CARE  Treatment Interventions:  Interventions: Manual Therapy, Neuromuscular Re-education, Therapeutic Activity, Therapeutic Exercise, Self-Care/Home Management    Long Term Goals     PT Goal 1  Goal Description: Pt will be able to sleep at night not waking up due to back pain for improved quality of sleep in 90 days.  Target Date: 10/09/23  PT Goal 2  Goal Description: Pt will be able to sit for 30 minutes for a meal and get up with back pain <2/10 for improved quality of transitions in 90 days.  Target Date: 10/09/23  PT Goal 3  Goal Description: Pt will be able to sit to watch a movie/show without increase in back pain while sitting for improved QOL in 90 days.  Target Date: 10/09/23      Frequency of  Treatment: 1x/week  Duration of Treatment: 90 days    Education Assessment:        Risks and benefits of evaluation/treatment have been explained.   Patient/Family/caregiver agrees with Plan of Care.     Evaluation Time:     PT Eval, Low Complexity Minutes (31221): 20    Signing Clinician: Olga Hernandez PT, DPT, CLT      [facility]                                                                                   OUTPATIENT PHYSICAL THERAPY      PLAN OF TREATMENT FOR OUTPATIENT REHABILITATION   Patient's Last Name, First Name, Maria R De Guzman YOB: 1975   Provider's Name   [facility]   Medical Record No.  [id number]     Onset Date: 04/12/23  Start of Care Date: 07/12/23     Medical Diagnosis:  Acute midline low back pain without sciatica      PT Treatment Diagnosis:  Acute B low back pain with decreased trunk ROM and muscle weakness Plan of Treatment  Frequency/Duration: 1x/week/ 90 days    Certification date from 07/12/23 to 10/09/23         See note for plan of treatment details and functional goals     Olga Hernandez PT, DPT, CLT                        I CERTIFY THE NEED FOR THESE SERVICES FURNISHED UNDER        THIS PLAN OF TREATMENT AND WHILE UNDER MY CARE     (Physician attestation of this document indicates review and certification of the therapy plan).                  Referring Provider:  Eloisa Sena NP      Initial Assessment  See Epic Evaluation- Start of Care Date: 07/12/23

## 2023-07-19 NOTE — PROGRESS NOTES
ASSESSMENT: Maria R Mcdaniel is a 48 year old female with past medical history significant for hyperprolactinemia, polyarthralgia, obesity, history of perforation of intestine, history of pancreatitis, hypertension, history of gastric bypass surgery, hypertension who presents today for new patient evaluation of a 2-month history of right greater than left low back/buttock pain.  My review of an x-ray lumbar spine shows mild to moderate disc degeneration L5-S1 and mild to moderate facet arthropathy at L5-S1 there is additional mild loss of disc height at all 5.  There is mild dextrocurvature lumbar spine.  Suspect she is symptomatic from sacroiliac joint dysfunction.  She had reproduction of pain with SI joint provocative maneuvers x3 on the right and x2 on the left.  She is neurologically intact.    PLAN:  A shared decision making model was used.  The patient's values and choices were respected.  The following represents what was discussed and decided upon by the physician assistant and the patient.      1.  DIAGNOSTIC TESTS:  - I reviewed the x-ray lumbar spine.  -No additional diagnostic test were ordered.  - If symptoms persist she may need an MRI lumbar spine.    2.  PHYSICAL THERAPY: Patient is currently in physical therapy.  She has had 1 session so far.  Encouraged her to continue with physical therapy and the home exercises.  I sent a message to her physical therapist requesting that they work on her sacroiliac joints.    3.  MEDICATIONS: No changes are made to the patient's medications.  Patient does not feel like she needs any additional pain relieving medications at this point.  - Patient can continue Advil 400 mg as needed.  -Patient completed a Medrol Dosepak which was not helpful.  -Patient tried methocarbamol at bedtime which was not very helpful    4.  INTERVENTIONS: No interventions were ordered today.  Patient is going to trial conservative treatments first.  If pain persist I would recommend  sacroiliac joint injections.    5.  PATIENT EDUCATION: Patient is in agreement the above plan.  All questions were answered.    6.  FOLLOW-UP:   Patient is going to follow-up with me in 6 weeks to monitor progress with physical therapy.  If he has questions or concerns in the meantime, she should not hesitate to call.      SUBJECTIVE:  Maria R Mcdaniel  Is a 48 year old female who presents today in consultation at the request of Eloisa Sena NP for new patient evaluation of low back pain.  Patient reports that pain began about 2 months ago.  She denies any injury or event to cause the pain.  She has had upper back pain in the past but never pain in the lower back.  Pain is persisting.    Patient complains of right greater than left low back/upper buttock pain.  She describes the pain as a constant hurting/pressure type pain.  It is worse with sitting and trying to sleep at night.  She feels better when she is moving around and applying heat.  She denies pain down the legs.  Denies any loss of bowel or bladder control.  Denies numbness, tingling, weakness down the legs.  Denies recent fevers, chills, sweats.    Treatment to date:  - Current physical therapy, 1 session so far  - Chiropractic treatment for upper back pain, but not for lower back pain  - Medrol Dosepak was not helpful  - Methocarbamol was not helpful  - Ibuprofen 400 mg as needed is somewhat helpful    Current Outpatient Medications   Medication     atorvastatin (LIPITOR) 40 MG tablet     losartan (COZAAR) 100 MG tablet     methocarbamol (ROBAXIN) 500 MG tablet     methylPREDNISolone (MEDROL DOSEPAK) 4 MG tablet therapy pack     No current facility-administered medications for this visit.       No Known Allergies      Patient Active Problem List   Diagnosis     Hyperprolactinemia     Obesity     Secondary Amenorrhea     Polyarthralgia     Primary osteoarthritis involving multiple joints     Essential hypertension     Perforation of intestine (H)      Idiopathic acute pancreatitis without infection or necrosis     Mild aortic valve sclerosis       Past Surgical History:   Procedure Laterality Date     ZZC GASTRIC BYPASS,OBESE<100CM RODY-EN-Y      Description: Gastric Surgery For Morbid Obesity Bypass With Rody-en-Y;  Recorded: 09/16/2008;       Family history: Mother and father had diabetes, father had heart disease, mother and father had hypertension, father had rheumatologic disease    Social history: Patient is single.  She works as a manager/ at a restaurant.  She denies tobacco, alcohol, illicit drug use.      Answers for HPI/ROS submitted by the patient on 7/18/2023  General Symptoms: No  Skin Symptoms: No  HENT Symptoms: No  EYE SYMPTOMS: No  HEART SYMPTOMS: No  LUNG SYMPTOMS: No  INTESTINAL SYMPTOMS: No  URINARY SYMPTOMS: No  GYNECOLOGIC SYMPTOMS: No  BREAST SYMPTOMS: No  SKELETAL SYMPTOMS: No  BLOOD SYMPTOMS: No  NERVOUS SYSTEM SYMPTOMS: No  MENTAL HEALTH SYMPTOMS: No          OBJECTIVE:  PHYSICAL EXAMINATION:    CONSTITUTIONAL:  Vital signs as above.  No acute distress.  The patient is well nourished and well groomed.  PSYCHIATRIC:  The patient is awake, alert, oriented to person, place, time and answering questions appropriately with clear speech.    HEENT: Normocephalic, atraumatic.  Sclera clear.  Neck is supple.  SKIN:  Skin over the face, bilateral lower extremities, and posterior torso is clean, dry, intact without rashes.    GAIT:  Gait is non-antalgic.  The patient is able to heel and toe walk without significant difficulty.    STANDING EXAMINATION: Tender to palpation bilateral sacroiliac joints.  Thoracolumbar range of motion is within normal limits.  She does have some increased pain at end lumbar extension.  MUSCLE STRENGTH:  The patient has 5/5 strength for the bilateral hip flexors, knee flexors/extensors, ankle dorsiflexors/plantar flexors, great toe extensors.  NEUROLOGICAL: 2+ patellar, and achilles reflexes bilaterally.   Negative Babinski's bilaterally.  No ankle clonus bilaterally. Sensation to light touch is intact in the bilateral L4, L5, and S1 dermatomes.  VASCULAR:  2/4 dorsalis pedis pulses bilaterally.  Bilateral lower extremities are warm.  There is no  Pitting edema of the bilateral lower extremities.  ABDOMINAL:  Soft, non-distended, non-tender throughout all quadrants.  No pulsatile mass palpated in the left lower quadrant.  LYMPH NODES:  No palpable or tender inguinal lymph nodes.  MUSCULOSKELETAL:  negative pelvic distraction test.  Positive thigh thrust right, negative thigh thrust left.  Positive Solomon's test bilaterally reproducing pain localizing to the sacroiliac joint.  Negative Gaenslen's test bilaterally.  Positive Yeoman's test bilaterally.     RESULTS: I reviewed the x-ray lumbar spine from [facility] dated June 28, 2023.  This shows mild dextroconvex curvature lumbar spine.  There is mild disc degeneration L4-5 and mild to moderate disc degeneration at L5-S1.  There is mild to moderate L5-S1 facet arthropathy.

## 2023-07-21 ENCOUNTER — OFFICE VISIT (OUTPATIENT)
Dept: PHYSICAL MEDICINE AND REHAB | Facility: CLINIC | Age: 48
End: 2023-07-21
Payer: COMMERCIAL

## 2023-07-21 ENCOUNTER — THERAPY VISIT (OUTPATIENT)
Dept: PHYSICAL THERAPY | Facility: REHABILITATION | Age: 48
End: 2023-07-21
Attending: NURSE PRACTITIONER
Payer: COMMERCIAL

## 2023-07-21 DIAGNOSIS — M53.3 SACROILIAC JOINT PAIN: Primary | ICD-10-CM

## 2023-07-21 DIAGNOSIS — M51.369 DDD (DEGENERATIVE DISC DISEASE), LUMBAR: ICD-10-CM

## 2023-07-21 DIAGNOSIS — M62.81 MUSCLE WEAKNESS (GENERALIZED): ICD-10-CM

## 2023-07-21 DIAGNOSIS — M25.69 DECREASED RANGE OF MOTION OF TRUNK AND BACK: ICD-10-CM

## 2023-07-21 DIAGNOSIS — M54.50 ACUTE MIDLINE LOW BACK PAIN WITHOUT SCIATICA: Primary | ICD-10-CM

## 2023-07-21 PROCEDURE — 99204 OFFICE O/P NEW MOD 45 MIN: CPT | Performed by: PHYSICIAN ASSISTANT

## 2023-07-21 PROCEDURE — 97110 THERAPEUTIC EXERCISES: CPT | Mod: GP

## 2023-07-21 PROCEDURE — 97140 MANUAL THERAPY 1/> REGIONS: CPT | Mod: GP

## 2023-07-21 NOTE — PATIENT INSTRUCTIONS
I believe your pain is coming from your sacroiliac joints.    Physical therapy is very helpful for this problem.    I also prescribed a sacroiliac belt that you can wear as needed.    If your pain does not improve we could try sacroiliac joint injections (cortisone shots).

## 2023-07-21 NOTE — LETTER
7/21/2023         RE: Maria R Mcdaniel  [address] [place] [facility] [address]        Dear Colleague,    Thank you for referring your patient, Maria R Mcdaniel, to the [facility]. Please see a copy of my visit note below.    ASSESSMENT: Maria R Mcdaniel is a 48 year old female with past medical history significant for hyperprolactinemia, polyarthralgia, obesity, history of perforation of intestine, history of pancreatitis, hypertension, history of gastric bypass surgery, hypertension who presents today for new patient evaluation of a 2-month history of right greater than left low back/buttock pain.  My review of an x-ray lumbar spine shows mild to moderate disc degeneration L5-S1 and mild to moderate facet arthropathy at L5-S1 there is additional mild loss of disc height at all 5.  There is mild dextrocurvature lumbar spine.  Suspect she is symptomatic from sacroiliac joint dysfunction.  She had reproduction of pain with SI joint provocative maneuvers x3 on the right and x2 on the left.  She is neurologically intact.    PLAN:  A shared decision making model was used.  The patient's values and choices were respected.  The following represents what was discussed and decided upon by the physician assistant and the patient.      1.  DIAGNOSTIC TESTS:  - I reviewed the x-ray lumbar spine.  -No additional diagnostic test were ordered.  - If symptoms persist she may need an MRI lumbar spine.    2.  PHYSICAL THERAPY: Patient is currently in physical therapy.  She has had 1 session so far.  Encouraged her to continue with physical therapy and the home exercises.  I sent a message to her physical therapist requesting that they work on her sacroiliac joints.    3.  MEDICATIONS: No changes are made to the patient's medications.  Patient does not feel like she needs any additional pain relieving medications at this point.  - Patient can continue Advil 400 mg as needed.  -Patient completed a  Medrol Dosepak which was not helpful.  -Patient tried methocarbamol at bedtime which was not very helpful    4.  INTERVENTIONS: No interventions were ordered today.  Patient is going to trial conservative treatments first.  If pain persist I would recommend sacroiliac joint injections.    5.  PATIENT EDUCATION: Patient is in agreement the above plan.  All questions were answered.    6.  FOLLOW-UP:   Patient is going to follow-up with me in 6 weeks to monitor progress with physical therapy.  If he has questions or concerns in the meantime, she should not hesitate to call.      SUBJECTIVE:  Maria R Mcdaniel  Is a 48 year old female who presents today in consultation at the request of Eloisa Sena NP for new patient evaluation of low back pain.  Patient reports that pain began about 2 months ago.  She denies any injury or event to cause the pain.  She has had upper back pain in the past but never pain in the lower back.  Pain is persisting.    Patient complains of right greater than left low back/upper buttock pain.  She describes the pain as a constant hurting/pressure type pain.  It is worse with sitting and trying to sleep at night.  She feels better when she is moving around and applying heat.  She denies pain down the legs.  Denies any loss of bowel or bladder control.  Denies numbness, tingling, weakness down the legs.  Denies recent fevers, chills, sweats.    Treatment to date:  - Current physical therapy, 1 session so far  - Chiropractic treatment for upper back pain, but not for lower back pain  - Medrol Dosepak was not helpful  - Methocarbamol was not helpful  - Ibuprofen 400 mg as needed is somewhat helpful    Current Outpatient Medications   Medication     atorvastatin (LIPITOR) 40 MG tablet     losartan (COZAAR) 100 MG tablet     methocarbamol (ROBAXIN) 500 MG tablet     methylPREDNISolone (MEDROL DOSEPAK) 4 MG tablet therapy pack     No current facility-administered medications for this visit.        No Known Allergies      Patient Active Problem List   Diagnosis     Hyperprolactinemia     Obesity     Secondary Amenorrhea     Polyarthralgia     Primary osteoarthritis involving multiple joints     Essential hypertension     Perforation of intestine (H)     Idiopathic acute pancreatitis without infection or necrosis     Mild aortic valve sclerosis       Past Surgical History:   Procedure Laterality Date     ZZC GASTRIC BYPASS,OBESE<100CM RODY-EN-Y      Description: Gastric Surgery For Morbid Obesity Bypass With Rody-en-Y;  Recorded: 09/16/2008;       Family history: Mother and father had diabetes, father had heart disease, mother and father had hypertension, father had rheumatologic disease    Social history: Patient is single.  She works as a manager/ at a restaurant.  She denies tobacco, alcohol, illicit drug use.      Answers for HPI/ROS submitted by the patient on 7/18/2023  General Symptoms: No  Skin Symptoms: No  HENT Symptoms: No  EYE SYMPTOMS: No  HEART SYMPTOMS: No  LUNG SYMPTOMS: No  INTESTINAL SYMPTOMS: No  URINARY SYMPTOMS: No  GYNECOLOGIC SYMPTOMS: No  BREAST SYMPTOMS: No  SKELETAL SYMPTOMS: No  BLOOD SYMPTOMS: No  NERVOUS SYSTEM SYMPTOMS: No  MENTAL HEALTH SYMPTOMS: No          OBJECTIVE:  PHYSICAL EXAMINATION:    CONSTITUTIONAL:  Vital signs as above.  No acute distress.  The patient is well nourished and well groomed.  PSYCHIATRIC:  The patient is awake, alert, oriented to person, place, time and answering questions appropriately with clear speech.    HEENT: Normocephalic, atraumatic.  Sclera clear.  Neck is supple.  SKIN:  Skin over the face, bilateral lower extremities, and posterior torso is clean, dry, intact without rashes.    GAIT:  Gait is non-antalgic.  The patient is able to heel and toe walk without significant difficulty.    STANDING EXAMINATION: Tender to palpation bilateral sacroiliac joints.  Thoracolumbar range of motion is within normal limits.  She does have some  increased pain at end lumbar extension.  MUSCLE STRENGTH:  The patient has 5/5 strength for the bilateral hip flexors, knee flexors/extensors, ankle dorsiflexors/plantar flexors, great toe extensors.  NEUROLOGICAL: 2+ patellar, and achilles reflexes bilaterally.  Negative Babinski's bilaterally.  No ankle clonus bilaterally. Sensation to light touch is intact in the bilateral L4, L5, and S1 dermatomes.  VASCULAR:  2/4 dorsalis pedis pulses bilaterally.  Bilateral lower extremities are warm.  There is no  Pitting edema of the bilateral lower extremities.  ABDOMINAL:  Soft, non-distended, non-tender throughout all quadrants.  No pulsatile mass palpated in the left lower quadrant.  LYMPH NODES:  No palpable or tender inguinal lymph nodes.  MUSCULOSKELETAL:  negative pelvic distraction test.  Positive thigh thrust right, negative thigh thrust left.  Positive Solomon's test bilaterally reproducing pain localizing to the sacroiliac joint.  Negative Gaenslen's test bilaterally.  Positive Yeoman's test bilaterally.     RESULTS: I reviewed the x-ray lumbar spine from [facility] dated June 28, 2023.  This shows mild dextroconvex curvature lumbar spine.  There is mild disc degeneration L4-5 and mild to moderate disc degeneration at L5-S1.  There is mild to moderate L5-S1 facet arthropathy.        Again, thank you for allowing me to participate in the care of your patient.        Sincerely,        Jenelle Orozco PA-C

## 2023-08-31 NOTE — PROGRESS NOTES
Assessment:   Maria R Mcdaniel is a 48 year old y.o. female with past medical history significant for hyperprolactinemia, polyarthralgia, obesity, history of perforation of intestine, history of pancreatitis, hypertension, history of gastric bypass surgery, hypertension  who presents today for follow-up regarding  right greater than left low back/buttock pain.  My review of an x-ray lumbar spine shows mild to moderate disc degeneration L5-S1 and mild to moderate facet arthropathy at L5-S1 there is additional mild loss of disc height at all 5.  There is mild dextrocurvature lumbar spine.  Suspect she is symptomatic from sacroiliac joint dysfunction.  She had reproduction of pain with SI joint provocative numerous x3 bilaterally.  However, her pain is quite severe and waking her from sleep at night.  I am going to check an MRI lumbar spine for further evaluation.  Her pain has not improved despite greater than 6 weeks of physical therapy.       Plan:     A shared decision making plan was used.  The patient's values and choices were respected.  The following represents what was discussed and decided upon by the physician assistant and the patient.      1.  DIAGNOSTIC TESTS:    - I reviewed the x-ray lumbar spine.  - I ordered an MRI lumbar spine for further evaluation.  Patient is having severe pain that wakes her from sleep at night.  It is getting worse despite conservative treatment including greater than 6 weeks of physical therapy, NSAIDs, and Tylenol.    2.  PHYSICAL THERAPY: Patient i went to 2 sessions of physical therapy.  She states that she felt very uncomfortable at physical therapy and going to physical therapy caused significant anxiety.  She is going to continue with home exercises.    3.  MEDICATIONS:  No changes are made to the patient's medications.  Patient does not feel like she needs any additional pain relieving medications at this point.  - Patient can continue Advil and Tylenol as  needed.  -Patient completed a Medrol Dosepak which was not helpful.  -Patient tried methocarbamol at bedtime which was not very helpful    4.  INTERVENTIONS: No interventions were ordered today.  We will await the results of her MRI lumbar spine.  As long as that does not show any additional findings cause pain in the upper buttock region I would recommend bilateral sacroiliac joint injections.    5.  PATIENT EDUCATION:  Patient is in agreement the above plan. All questions were answered.     6.  FOLLOW-UP: I will send the patient a [facility] message with her MRI results.  At that time I would likely offer her bilateral sacroiliac joint injections.  If she has questions or concerns in the meantime, she should not hesitate to call.    Subjective:     Maria R Mcdaniel is a 48 year old female who presents today for follow-up regarding low back pain.  I saw the patient in consultation July 21, 2023.  I recommended that she continue with physical therapy.  Patient has had 2 sessions.  Patient reports her pain is getting worse.    Patient complains of bilateral low back/upper buttock pain.  Pain is slightly worse on the right than the left.  Pain is now radiating out to the lateral hips.  She denies any pain further down the legs.  Denies numbness, tingling, weakness down the legs.  She rates her pain today as an 8 out of 10.  At its worst it is a 10 out of 10.  At its best it is a 5 out of 10.  Pain is worse with sitting and lying down.  The pain wakes her from sleep at night.  Pain is alleviated with movement, heat, ice.  She denies any new symptoms since she was last seen.    Treatment to date:  - Patient went to 2 sessions of physical therapy.  She states that the physical therapy was very uncomfortable and caused significant anxiety.  She is doing her home exercises.  - Chiropractic treatment for upper back pain, but not for lower back pain  - Medrol Dosepak was not helpful  - Methocarbamol was not helpful  -  Ibuprofen not helping  - Tylenol not helping    Review of Systems:  Positive for pain much worse at night.  Negative for numbness/tingling, weakness, loss of bowel/bladder control, inability to urinate, headache, trip/stumble/falls, difficulty swallowing, difficulty with hand skills, fevers, unintentional weight loss.     Objective:   CONSTITUTIONAL:  Vital signs as above.  No acute distress.  The patient is well nourished and well groomed.    PSYCHIATRIC:  The patient is awake, alert, oriented to person, place and time.  The patient is answering questions appropriately with clear speech.  Normal affect.  HEENT: Normocephalic, atraumatic.  Sclera clear.    SKIN:  Skin over the face, posterior torso, bilateral upper and lower extremities is clean, dry, intact without rashes.  VASCULAR: No significant lower extremity edema.  MUSCULOSKELETAL:  Gait is non-antalgic.  The patient is able to rise into toes and heels bilaterally.  Mild tenderness palpation bilateral sacroiliac joints and bilateral greater trochanters.  Positive Tino finger test bilaterally.  The patient has 5/5 strength for the bilateral hip flexors, knee flexors/extensors, ankle dorsiflexors/plantar flexors.  Negative pelvic distraction test.  Positive thigh thrust bilaterally.  Positive Solomon's test bilaterally reproducing pain localizing to the sacroiliac joints.  Negative Gaenslen's test bilaterally.  Positive Yeoman's test bilaterally.  NEUROLOGICAL:  No ankle clonus bilaterally.  Sensation to light touch is intact in the bilateral L4, L5, and S1 dermatomes.       RESULTS:   I reviewed the x-ray lumbar spine from [facility] dated June 28, 2023.  This shows mild dextroconvex curvature lumbar spine.  There is mild disc degeneration L4-5 and mild to moderate disc degeneration at L5-S1.  There is mild to moderate L5-S1 facet arthropathy.

## 2023-09-06 ENCOUNTER — OFFICE VISIT (OUTPATIENT)
Dept: PHYSICAL MEDICINE AND REHAB | Facility: CLINIC | Age: 48
End: 2023-09-06
Payer: COMMERCIAL

## 2023-09-06 VITALS
HEART RATE: 69 BPM | SYSTOLIC BLOOD PRESSURE: 120 MMHG | BODY MASS INDEX: 32.65 KG/M2 | HEIGHT: 65 IN | WEIGHT: 196 LBS | DIASTOLIC BLOOD PRESSURE: 67 MMHG

## 2023-09-06 DIAGNOSIS — M51.369 DDD (DEGENERATIVE DISC DISEASE), LUMBAR: ICD-10-CM

## 2023-09-06 DIAGNOSIS — M53.3 SACROILIAC JOINT PAIN: Primary | ICD-10-CM

## 2023-09-06 DIAGNOSIS — M47.816 LUMBAR FACET ARTHROPATHY: ICD-10-CM

## 2023-09-06 PROCEDURE — 99214 OFFICE O/P EST MOD 30 MIN: CPT | Performed by: PHYSICIAN ASSISTANT

## 2023-09-06 ASSESSMENT — PAIN SCALES - GENERAL: PAINLEVEL: EXTREME PAIN (8)

## 2023-09-06 NOTE — LETTER
9/6/2023         RE: Maria R Mcdaniel  [address] [facility] [address]        Dear Colleague,    Thank you for referring your patient, Maria R Mcdaniel, to the [facility]. Please see a copy of my visit note below.    Assessment:   Maria R Mcdaniel is a 48 year old y.o. female with past medical history significant for hyperprolactinemia, polyarthralgia, obesity, history of perforation of intestine, history of pancreatitis, hypertension, history of gastric bypass surgery, hypertension  who presents today for follow-up regarding  right greater than left low back/buttock pain.  My review of an x-ray lumbar spine shows mild to moderate disc degeneration L5-S1 and mild to moderate facet arthropathy at L5-S1 there is additional mild loss of disc height at all 5.  There is mild dextrocurvature lumbar spine.  Suspect she is symptomatic from sacroiliac joint dysfunction.  She had reproduction of pain with SI joint provocative numerous x3 bilaterally.  However, her pain is quite severe and waking her from sleep at night.  I am going to check an MRI lumbar spine for further evaluation.  Her pain has not improved despite greater than 6 weeks of physical therapy.       Plan:     A shared decision making plan was used.  The patient's values and choices were respected.  The following represents what was discussed and decided upon by the physician assistant and the patient.      1.  DIAGNOSTIC TESTS:    - I reviewed the x-ray lumbar spine.  - I ordered an MRI lumbar spine for further evaluation.  Patient is having severe pain that wakes her from sleep at night.  It is getting worse despite conservative treatment including greater than 6 weeks of physical therapy, NSAIDs, and Tylenol.    2.  PHYSICAL THERAPY: Patient i went to 2 sessions of physical therapy.  She states that she felt very uncomfortable at physical therapy and going to physical therapy caused significant anxiety.  She is  going to continue with home exercises.    3.  MEDICATIONS:  No changes are made to the patient's medications.  Patient does not feel like she needs any additional pain relieving medications at this point.  - Patient can continue Advil and Tylenol as needed.  -Patient completed a Medrol Dosepak which was not helpful.  -Patient tried methocarbamol at bedtime which was not very helpful    4.  INTERVENTIONS: No interventions were ordered today.  We will await the results of her MRI lumbar spine.  As long as that does not show any additional findings cause pain in the upper buttock region I would recommend bilateral sacroiliac joint injections.    5.  PATIENT EDUCATION:  Patient is in agreement the above plan. All questions were answered.     6.  FOLLOW-UP: I will send the patient a [employer] message with her MRI results.  At that time I would likely offer her bilateral sacroiliac joint injections.  If she has questions or concerns in the meantime, she should not hesitate to call.    Subjective:     Maria R Mcdaniel is a 48 year old female who presents today for follow-up regarding low back pain.  I saw the patient in consultation July 21, 2023.  I recommended that she continue with physical therapy.  Patient has had 2 sessions.  Patient reports her pain is getting worse.    Patient complains of bilateral low back/upper buttock pain.  Pain is slightly worse on the right than the left.  Pain is now radiating out to the lateral hips.  She denies any pain further down the legs.  Denies numbness, tingling, weakness down the legs.  She rates her pain today as an 8 out of 10.  At its worst it is a 10 out of 10.  At its best it is a 5 out of 10.  Pain is worse with sitting and lying down.  The pain wakes her from sleep at night.  Pain is alleviated with movement, heat, ice.  She denies any new symptoms since she was last seen.    Treatment to date:  - Patient went to 2 sessions of physical therapy.  She states that the  physical therapy was very uncomfortable and caused significant anxiety.  She is doing her home exercises.  - Chiropractic treatment for upper back pain, but not for lower back pain  - Medrol Dosepak was not helpful  - Methocarbamol was not helpful  - Ibuprofen not helping  - Tylenol not helping    Review of Systems:  Positive for pain much worse at night.  Negative for numbness/tingling, weakness, loss of bowel/bladder control, inability to urinate, headache, trip/stumble/falls, difficulty swallowing, difficulty with hand skills, fevers, unintentional weight loss.     Objective:   CONSTITUTIONAL:  Vital signs as above.  No acute distress.  The patient is well nourished and well groomed.    PSYCHIATRIC:  The patient is awake, alert, oriented to person, place and time.  The patient is answering questions appropriately with clear speech.  Normal affect.  HEENT: Normocephalic, atraumatic.  Sclera clear.    SKIN:  Skin over the face, posterior torso, bilateral upper and lower extremities is clean, dry, intact without rashes.  VASCULAR: No significant lower extremity edema.  MUSCULOSKELETAL:  Gait is non-antalgic.  The patient is able to rise into toes and heels bilaterally.  Mild tenderness palpation bilateral sacroiliac joints and bilateral greater trochanters.  Positive Tino finger test bilaterally.  The patient has 5/5 strength for the bilateral hip flexors, knee flexors/extensors, ankle dorsiflexors/plantar flexors.  Negative pelvic distraction test.  Positive thigh thrust bilaterally.  Positive Solomon's test bilaterally reproducing pain localizing to the sacroiliac joints.  Negative Gaenslen's test bilaterally.  Positive Yeoman's test bilaterally.  NEUROLOGICAL:  No ankle clonus bilaterally.  Sensation to light touch is intact in the bilateral L4, L5, and S1 dermatomes.       RESULTS:   I reviewed the x-ray lumbar spine from [facility] dated June 28, 2023.  This shows mild dextroconvex curvature lumbar spine.   There is mild disc degeneration L4-5 and mild to moderate disc degeneration at L5-S1.  There is mild to moderate L5-S1 facet arthropathy.         Again, thank you for allowing me to participate in the care of your patient.        Sincerely,        Jenelle Orozco PA-C

## 2023-09-06 NOTE — PATIENT INSTRUCTIONS
[facility] Scheduling    Please call [phone] to schedule your image(s) (select option#1). There are 2 different locations, see below. You can do walk-in visits for xray only images if you want.     [facility]  [address] [place] [id number]    [address] [address]

## 2023-09-18 ENCOUNTER — HOSPITAL ENCOUNTER (OUTPATIENT)
Dept: MRI IMAGING | Facility: HOSPITAL | Age: 48
Discharge: HOME | End: 2023-09-18
Attending: PHYSICIAN ASSISTANT | Admitting: PHYSICIAN ASSISTANT
Payer: COMMERCIAL

## 2023-09-18 DIAGNOSIS — M51.369 DDD (DEGENERATIVE DISC DISEASE), LUMBAR: ICD-10-CM

## 2023-09-18 PROCEDURE — 72148 MRI LUMBAR SPINE W/O DYE: CPT

## 2023-09-20 ENCOUNTER — TELEPHONE (OUTPATIENT)
Dept: PHYSICAL MEDICINE AND REHAB | Facility: CLINIC | Age: 48
End: 2023-09-20

## 2023-09-20 DIAGNOSIS — M53.3 SACROILIAC JOINT PAIN: Primary | ICD-10-CM

## 2023-09-20 NOTE — TELEPHONE ENCOUNTER
Call placed to patient with provider's results and recommendations.  Pt stated understanding. She would like to proceed with injections. Order placed for Bilateral SI Joint Injections. Injection requirements reviewed. Pt states she is getting a shoulder injection next Tuesday 9/26. She will schedule her SI joint injections for 10/10 or later. Transferred to scheduling to make appt.

## 2023-09-20 NOTE — TELEPHONE ENCOUNTER
----- Message from Jenelle Orozco PA-C sent at 9/20/2023 11:35 AM CDT -----  Please call this patient and let her know that I reviewed her MRI lumbar spine.  This shows wear and tear to several of the  cartilage discs in her back and arthritis in the joints.  These changes are causing mild to moderate narrowing around several nerves.  When I evaluated the patient previously I felt she was most symptomatic from SI joint dysfunction.  I recommend trying bilateral SI joint injections first, but if those don't help we  could try targeting other areas with injections.  Please assist with scheduling the SI joint injections if she would like.  I am also happy to see the patient back in the clinic to review the results and discuss treatment options in person.

## 2023-09-27 ENCOUNTER — TELEPHONE (OUTPATIENT)
Dept: PHYSICAL MEDICINE AND REHAB | Facility: CLINIC | Age: 48
End: 2023-09-27

## 2023-09-27 NOTE — TELEPHONE ENCOUNTER
"Per [facility] Jenelle Orozco PA-C:     Care navigation: Can you please call this patient and let her know that her injection was denied because she has not had 4 physical therapy visits.  She needs to complete 4 visits within 6 weeks and then we can submit the request for authorization again.  I know the patient did not feel comfortable with her last physical therapist.  Please let her know that she could request to see a different physical therapist.     Phone call to patient to inform. Discussed the requirements of the 4 PT sessions and then resubmission to insurance company. Patient crying. \"I can't even work right now. I did up until Monday, but haven't since.\" Offered her a follow up with [facility] to re-evaluate and possible work restriction letter. Patient declined.     Instructed to call back once she has completed the 4 sessions of PT. Stated understanding.   "

## 2023-12-23 ENCOUNTER — HEALTH MAINTENANCE LETTER (OUTPATIENT)
Age: 48
End: 2023-12-23

## 2024-01-12 NOTE — PROGRESS NOTES
DISCHARGE  Reason for Discharge: Patient did not return to plan formal discharge.     Referring Provider:  Eloisa Sena             07/21/23 0500   Appointment Info   Signing clinician's name / credentials James Wallace, PT, DPT, CSCS   Total/Authorized Visits 12   Visits Used 2   Medical Diagnosis Acute midline low back pain without sciatica   PT Tx Diagnosis Acute B low back pain with decreased trunk ROM and muscle weakness   Precautions/Limitations none   Quick Adds Certification   Progress Note/Certification   Start of Care Date 07/12/23   Onset of illness/injury or Date of Surgery 04/12/23   Therapy Frequency 1x/week   Predicted Duration 90 days   Certification date from 07/12/23   Certification date to 10/09/23   Progress Note Due Date 10/09/23   Progress Note Completed Date 07/12/23   GOALS   PT Goals 2;3   PT Goal 1   Goal Description Pt will be able to sleep at night not waking up due to back pain for improved quality of sleep in 90 days.   Target Date 10/09/23   PT Goal 2   Goal Description Pt will be able to sit for 30 minutes for a meal and get up with back pain <2/10 for improved quality of transitions in 90 days.   Target Date 10/09/23   PT Goal 3   Goal Description Pt will be able to sit to watch a movie/show without increase in back pain while sitting for improved QOL in 90 days.   Target Date 10/09/23   Subjective Report   Subjective Report Patient reports she has been diligent with her exercises and she feels that the 12-6 pelvic exercise has helped her the most.   Treatment Interventions (PT)   Interventions Therapeutic Procedure/Exercise;Neuromuscular Re-education;Manual Therapy   Therapeutic Procedure/Exercise   Therapeutic Procedures: strength, endurance, ROM, flexibillity minutes (17844) 25   PTRx Ther Proc 3 - Details Education on progressive activity to improve tolerance; Education on pain threshold and sensitization; Lumbar mobility routine (Hooklying lumbar roll, supine piriformis  stretch, open book, cat cow, vivian pose); prone lumbar traction off table; seated lumbar elevated traction   Skilled Intervention Lumbar mobility to improve symptoms and desensitize pain   Patient Response/Progress Patient demonstrated understanding; patient reported improved symptoms with exercise   Manual Therapy   Manual Therapy: Mobilization, MFR, MLD, friction massage minutes (11304) 15   Manual Therapy 1 - Details Prone L1-L5 CPA gr2-3; Prone bilateral L1-L5 UPA gr 2-3; STM to bilateral paraspinals, QLs, piriformis, and glutes   Skilled Intervention mobilization; STM   Patient Response/Progress TTP R>L   Plan   Home program PTRx [id number]   Plan for next session Assess response to ROM/mobility exercises and add posterior chain, lateral hip and core strengthening to HEP as able.   Comments   Comments Patient reports she has been doing her HEP and pelvic tilts have given her the most benefit but she still gets a lot of pain at the end of the work day. Patient was TTP R>L. Patient was educated on desensitizing pain threshold and patient demonstrated understanding. Patient instructed to perform mobility routine exercises more frequently throughout the day in order to improve symptoms. Continue to progress strength to improve function.   Total Session Time   Timed Code Treatment Minutes 40   Total Treatment Time (sum of timed and untimed services) 40

## 2024-07-20 ENCOUNTER — HEALTH MAINTENANCE LETTER (OUTPATIENT)
Age: 49
End: 2024-07-20

## 2024-12-01 ENCOUNTER — OFFICE VISIT (OUTPATIENT)
Dept: URGENT CARE | Facility: URGENT CARE | Age: 49
End: 2024-12-01
Payer: COMMERCIAL

## 2024-12-01 VITALS
TEMPERATURE: 97.9 F | RESPIRATION RATE: 16 BRPM | BODY MASS INDEX: 27.38 KG/M2 | DIASTOLIC BLOOD PRESSURE: 85 MMHG | OXYGEN SATURATION: 100 % | WEIGHT: 162 LBS | HEART RATE: 74 BPM | SYSTOLIC BLOOD PRESSURE: 138 MMHG

## 2024-12-01 DIAGNOSIS — R30.9 PAINFUL URINATION: Primary | ICD-10-CM

## 2024-12-01 LAB
ALBUMIN UR-MCNC: NEGATIVE MG/DL
APPEARANCE UR: CLEAR
BACTERIA #/AREA URNS HPF: ABNORMAL /HPF
BILIRUB UR QL STRIP: NEGATIVE
COLOR UR AUTO: YELLOW
GLUCOSE UR STRIP-MCNC: NEGATIVE MG/DL
HGB UR QL STRIP: ABNORMAL
KETONES UR STRIP-MCNC: NEGATIVE MG/DL
LEUKOCYTE ESTERASE UR QL STRIP: ABNORMAL
NITRATE UR QL: NEGATIVE
PH UR STRIP: 6 [PH] (ref 5–8)
RBC #/AREA URNS AUTO: ABNORMAL /HPF
SP GR UR STRIP: <=1.005 (ref 1–1.03)
SQUAMOUS #/AREA URNS AUTO: ABNORMAL /LPF
UROBILINOGEN UR STRIP-ACNC: 0.2 E.U./DL
WBC #/AREA URNS AUTO: ABNORMAL /HPF
WBC CLUMPS #/AREA URNS HPF: PRESENT /HPF

## 2024-12-01 PROCEDURE — 87086 URINE CULTURE/COLONY COUNT: CPT | Performed by: PHYSICIAN ASSISTANT

## 2024-12-01 PROCEDURE — 99213 OFFICE O/P EST LOW 20 MIN: CPT | Performed by: PHYSICIAN ASSISTANT

## 2024-12-01 PROCEDURE — 81001 URINALYSIS AUTO W/SCOPE: CPT | Performed by: PHYSICIAN ASSISTANT

## 2024-12-01 PROCEDURE — G2211 COMPLEX E/M VISIT ADD ON: HCPCS | Performed by: PHYSICIAN ASSISTANT

## 2024-12-01 PROCEDURE — 87186 SC STD MICRODIL/AGAR DIL: CPT | Performed by: PHYSICIAN ASSISTANT

## 2024-12-01 RX ORDER — LANOLIN ALCOHOL/MO/W.PET/CERES
1000 CREAM (GRAM) TOPICAL DAILY
COMMUNITY

## 2024-12-01 RX ORDER — SULFAMETHOXAZOLE AND TRIMETHOPRIM 800; 160 MG/1; MG/1
1 TABLET ORAL 2 TIMES DAILY
Qty: 14 TABLET | Refills: 0 | Status: SHIPPED | OUTPATIENT
Start: 2024-12-01 | End: 2024-12-08

## 2024-12-01 RX ORDER — FERROUS GLUCONATE 324(38)MG
324 TABLET ORAL
COMMUNITY

## 2024-12-01 ASSESSMENT — ENCOUNTER SYMPTOMS
DYSURIA: 1
DIARRHEA: 0
FLANK PAIN: 0
SORE THROAT: 0
SHORTNESS OF BREATH: 0
FREQUENCY: 0
FEVER: 0
ADENOPATHY: 0
VOMITING: 0
MYALGIAS: 0
EYE PAIN: 0
FATIGUE: 0
ARTHRALGIAS: 0
HEMATURIA: 0
COLOR CHANGE: 0
ABDOMINAL PAIN: 1

## 2024-12-01 NOTE — PROGRESS NOTES
Patient presents with:  burning with urination: Burning with urination, frequency x 4 days       Clinical Decision Making: Urinalysis shows an acute urinary tract infection. We will treat with Bactrim this time because last time she was treated with Keflex. Patient advised to take the full course of antibiotics and increase fluids. We will contact her if the urine culture shows something different. Return precautions discussed such as new fever, worsening pain, new flank pain, new vaginal discharge, or if symptoms don't improve over the next 3 days.       ICD-10-CM    1. Painful urination  R30.9 UA with Microscopic reflex to Culture - Clinic Collect     UA Microscopic with Reflex to Culture     Urine Culture     sulfamethoxazole-trimethoprim (BACTRIM DS) 800-160 MG tablet          Patient Instructions   1. Increase fluid intake  2. Complete antibiotic regimen as prescribed. You will be notified if the treatment plan needs to be changed based on the urine culture results.   3. Follow if you have a fever of 100.4 F (38 C) or higher, no improvement after three days of treatment, trouble urinating because of pain,new or increased discharge from the vagina, rash or joint pain, Increased back or abdominal pain.      HPI:  Maria R Mcdaniel is a 49 year old female who presents today with concerns of suprapubic pain, foul smelling urine, and sharp urethral pain with urination lasting 4 days. Patient states that she has these symptoms when she has a UTI. Her last UTI was around 2 months ago and she was treated with keflex. Since symptoms started she has been drinking cranberry juice and water which have helped decrease the discomfort. Denies fever, blood int he urine, fatigue, body aches, recent travel, shortness of breath, vaginal discharge, vaginal itching, vaginal sores, and she is currently not sexually active.      History obtained from the patient.    Problem List:  2023-07: Acute midline low back pain without  sciatica  2023-07: Decreased range of motion of trunk and back  2023-07: Muscle weakness (generalized)  2023-04: Mild aortic valve sclerosis  2022-02: Perforation of intestine (H)  2022-02: Idiopathic acute pancreatitis without infection or necrosis  2018-12: Polyarthralgia  2018-12: Primary osteoarthritis involving multiple joints  2018-12: Essential hypertension  Hyperprolactinemia  Obesity  Secondary Amenorrhea      No past medical history on file.    Social History     Tobacco Use    Smoking status: Former     Types: Cigarettes    Smokeless tobacco: Never   Substance Use Topics    Alcohol use: Yes     Comment: Alcoholic Drinks/day: occasionally          Review of Systems   Constitutional:  Negative for fatigue and fever.   HENT:  Negative for congestion and sore throat.    Eyes:  Negative for pain.   Respiratory:  Negative for shortness of breath.    Cardiovascular:  Negative for chest pain.   Gastrointestinal:  Positive for abdominal pain. Negative for diarrhea and vomiting.   Genitourinary:  Positive for dysuria. Negative for flank pain, frequency, hematuria, vaginal bleeding, vaginal discharge and vaginal pain.   Musculoskeletal:  Negative for arthralgias and myalgias.   Skin:  Negative for color change and rash.   Hematological:  Negative for adenopathy.       Vitals:    12/01/24 1731   BP: 138/85   BP Location: Right arm   Patient Position: Sitting   Cuff Size: Adult Regular   Pulse: 74   Resp: 16   Temp: 97.9  F (36.6  C)   TempSrc: Oral   SpO2: 100%   Weight: 73.5 kg (162 lb)       Physical Exam  Constitutional:       Appearance: Normal appearance.   HENT:      Head: Normocephalic and atraumatic.      Right Ear: No mastoid tenderness.      Left Ear: No mastoid tenderness.      Nose: Nose normal.      Mouth/Throat:      Lips: Pink.      Mouth: Mucous membranes are moist.      Pharynx: Oropharynx is clear.      Tonsils: No tonsillar exudate.   Eyes:      Extraocular Movements: Extraocular movements intact.       Conjunctiva/sclera: Conjunctivae normal.   Neck:      Thyroid: No thyroid mass or thyromegaly.      Trachea: Trachea normal.   Cardiovascular:      Rate and Rhythm: Normal rate.      Pulses: Normal pulses.   Pulmonary:      Breath sounds: Normal breath sounds.   Abdominal:      General: Bowel sounds are normal.      Palpations: Abdomen is soft. There is no hepatomegaly, splenomegaly or mass.      Tenderness: There is abdominal tenderness in the suprapubic area. There is no right CVA tenderness, left CVA tenderness, guarding or rebound. Negative signs include Baltazar's sign.   Musculoskeletal:      Cervical back: Normal range of motion and neck supple.   Lymphadenopathy:      Cervical: No cervical adenopathy.   Skin:     General: Skin is warm and dry.      Findings: No lesion or rash.   Neurological:      Mental Status: She is alert.   Psychiatric:         Behavior: Behavior is cooperative.         Results:  Results for orders placed or performed in visit on 12/01/24   UA with Microscopic reflex to Culture - Clinic Collect     Status: Abnormal    Specimen: Urine, Midstream   Result Value Ref Range    Color Urine Yellow Colorless, Straw, Light Yellow, Yellow    Appearance Urine Clear Clear    Glucose Urine Negative Negative mg/dL    Bilirubin Urine Negative Negative    Ketones Urine Negative Negative mg/dL    Specific Gravity Urine <=1.005 1.005 - 1.030    Blood Urine Moderate (A) Negative    pH Urine 6.0 5.0 - 8.0    Protein Albumin Urine Negative Negative mg/dL    Urobilinogen Urine 0.2 0.2, 1.0 E.U./dL    Nitrite Urine Negative Negative    Leukocyte Esterase Urine Moderate (A) Negative   UA Microscopic with Reflex to Culture     Status: Abnormal   Result Value Ref Range    Bacteria Urine Few (A) None Seen /HPF    RBC Urine 2-5 (A) 0-2 /HPF /HPF    WBC Urine 10-25 (A) 0-5 /HPF /HPF    Squamous Epithelials Urine Few (A) None Seen /LPF    WBC Clumps Urine Present (A) None Seen /HPF         At the end of the  encounter, I discussed results, diagnosis, medications. Discussed red flags for immediate return to clinic/ER, as well as indications for follow up if no improvement. Patient understood and agreed to plan. Patient was stable for discharge.    Seen in conjunction with Jose De Jesus Louise, [employer] student.

## 2024-12-03 LAB — BACTERIA UR CULT: ABNORMAL

## 2024-12-03 NOTE — RESULT ENCOUNTER NOTE
On appropriate abx.     Roxanne Downs MD she/her  12/03/24  *You can generally contact me quickly via Epic chat*

## 2025-04-26 ENCOUNTER — HEALTH MAINTENANCE LETTER (OUTPATIENT)
Age: 50
End: 2025-04-26

## 2025-08-09 ENCOUNTER — HEALTH MAINTENANCE LETTER (OUTPATIENT)
Age: 50
End: 2025-08-09